# Patient Record
Sex: FEMALE | Race: BLACK OR AFRICAN AMERICAN | NOT HISPANIC OR LATINO | URBAN - METROPOLITAN AREA
[De-identification: names, ages, dates, MRNs, and addresses within clinical notes are randomized per-mention and may not be internally consistent; named-entity substitution may affect disease eponyms.]

---

## 2017-05-01 ENCOUNTER — EMERGENCY (EMERGENCY)
Facility: HOSPITAL | Age: 37
LOS: 0 days | Discharge: HOME | End: 2017-05-01
Attending: EMERGENCY MEDICINE
Payer: MEDICAID

## 2017-05-01 VITALS
DIASTOLIC BLOOD PRESSURE: 87 MMHG | RESPIRATION RATE: 16 BRPM | WEIGHT: 162.92 LBS | OXYGEN SATURATION: 99 % | HEIGHT: 65 IN | SYSTOLIC BLOOD PRESSURE: 128 MMHG | TEMPERATURE: 98 F | HEART RATE: 87 BPM

## 2017-05-01 VITALS
HEART RATE: 72 BPM | DIASTOLIC BLOOD PRESSURE: 67 MMHG | SYSTOLIC BLOOD PRESSURE: 131 MMHG | OXYGEN SATURATION: 100 % | RESPIRATION RATE: 17 BRPM | TEMPERATURE: 98 F

## 2017-05-01 DIAGNOSIS — M25.551 PAIN IN RIGHT HIP: ICD-10-CM

## 2017-05-01 DIAGNOSIS — M54.31 SCIATICA, RIGHT SIDE: ICD-10-CM

## 2017-05-01 PROCEDURE — 99283 EMERGENCY DEPT VISIT LOW MDM: CPT

## 2017-05-01 RX ORDER — CYCLOBENZAPRINE HYDROCHLORIDE 10 MG/1
10 TABLET, FILM COATED ORAL ONCE
Qty: 0 | Refills: 0 | Status: COMPLETED | OUTPATIENT
Start: 2017-05-01 | End: 2017-05-01

## 2017-05-01 RX ORDER — CYCLOBENZAPRINE HYDROCHLORIDE 10 MG/1
1 TABLET, FILM COATED ORAL
Qty: 15 | Refills: 0
Start: 2017-05-01 | End: 2017-05-06

## 2017-05-01 RX ADMIN — CYCLOBENZAPRINE HYDROCHLORIDE 10 MILLIGRAM(S): 10 TABLET, FILM COATED ORAL at 10:48

## 2017-05-01 NOTE — ED PROVIDER NOTE - OBJECTIVE STATEMENT
Pt is a 38 yo lady with no significant past medical history who presents to the ED with R. back pain shooting down leg. No trauma, no fevers, no n/v/d, no chest pain, no sob. Has had back pain before that resolves. This pain started 2 days ago, constant, no bowel or bladder incontinence, no saddle anesthesia. No numbness or tingling.

## 2017-05-01 NOTE — ED ADULT NURSE NOTE - OBJECTIVE STATEMENT
patient received, alert and oriented x3, complaining of right hip pain on and off for 1 week, worsening today, denies falling/trauma. no distress noted.

## 2017-05-09 ENCOUNTER — TRANSCRIPTION ENCOUNTER (OUTPATIENT)
Age: 37
End: 2017-05-09

## 2017-05-16 ENCOUNTER — TRANSCRIPTION ENCOUNTER (OUTPATIENT)
Age: 37
End: 2017-05-16

## 2018-09-14 NOTE — ED PROVIDER NOTE - CPE EDP [facility] NORM
Case d/w Emma Johnson NP at [facility] Pt is in [facility] ER for SOB/ progressive metastatic breast cancer Pt is doing wBXRT for brain mets that were significantly worse Pt has progressive body disease also and was most recently on tykerb/ xeloda which was stopped Discussion with Dr Jose Isaacs is regarding possibility of leptomeningeal disease One LP showed atypical cells and pt was supposed to have another LP at [facility] Pt is aware of palliative goal of care Was considering further chemo. Not sure would do IT chemo but have not discussed this. [id number] team to see pt
normal...

## 2019-04-22 ENCOUNTER — TRANSCRIPTION ENCOUNTER (OUTPATIENT)
Age: 39
End: 2019-04-22

## 2020-04-25 ENCOUNTER — MESSAGE (OUTPATIENT)
Age: 40
End: 2020-04-25

## 2022-02-14 NOTE — ED ADULT TRIAGE NOTE - RESPIRATORY RATE (BREATHS/MIN)
You can try Vaseline on his face as needed.   Stop adding alcohol to his bath water.   Try using a different more moisturizing cleanser such as Cetaphil Baby Line or CeraVe baby lines.   You can also use one of their moisturizing creams on his body.   Call or message if his skin is getting worse.   Return for his next well visit or sooner as needed.     16

## 2023-02-02 ENCOUNTER — INPATIENT (INPATIENT)
Facility: HOSPITAL | Age: 43
LOS: 0 days | Discharge: HOME | DRG: 247 | End: 2023-02-03
Attending: INTERNAL MEDICINE | Admitting: INTERNAL MEDICINE
Payer: COMMERCIAL

## 2023-02-02 ENCOUNTER — EMERGENCY (EMERGENCY)
Facility: HOSPITAL | Age: 43
LOS: 0 days | Discharge: ANOTHER HOSPITAL | End: 2023-02-02
Attending: STUDENT IN AN ORGANIZED HEALTH CARE EDUCATION/TRAINING PROGRAM
Payer: COMMERCIAL

## 2023-02-02 VITALS
RESPIRATION RATE: 20 BRPM | DIASTOLIC BLOOD PRESSURE: 95 MMHG | OXYGEN SATURATION: 100 % | WEIGHT: 134.92 LBS | SYSTOLIC BLOOD PRESSURE: 126 MMHG | HEIGHT: 65 IN | HEART RATE: 54 BPM | TEMPERATURE: 97 F

## 2023-02-02 VITALS
TEMPERATURE: 98 F | HEART RATE: 70 BPM | OXYGEN SATURATION: 99 % | RESPIRATION RATE: 16 BRPM | DIASTOLIC BLOOD PRESSURE: 100 MMHG | SYSTOLIC BLOOD PRESSURE: 125 MMHG

## 2023-02-02 DIAGNOSIS — I21.3 ST ELEVATION (STEMI) MYOCARDIAL INFARCTION OF UNSPECIFIED SITE: ICD-10-CM

## 2023-02-02 DIAGNOSIS — Z20.822 CONTACT WITH AND (SUSPECTED) EXPOSURE TO COVID-19: ICD-10-CM

## 2023-02-02 DIAGNOSIS — F17.200 NICOTINE DEPENDENCE, UNSPECIFIED, UNCOMPLICATED: ICD-10-CM

## 2023-02-02 DIAGNOSIS — Z91.012 ALLERGY TO EGGS: ICD-10-CM

## 2023-02-02 DIAGNOSIS — Z98.891 HISTORY OF UTERINE SCAR FROM PREVIOUS SURGERY: Chronic | ICD-10-CM

## 2023-02-02 DIAGNOSIS — R00.1 BRADYCARDIA, UNSPECIFIED: ICD-10-CM

## 2023-02-02 DIAGNOSIS — R07.89 OTHER CHEST PAIN: ICD-10-CM

## 2023-02-02 DIAGNOSIS — I10 ESSENTIAL (PRIMARY) HYPERTENSION: ICD-10-CM

## 2023-02-02 DIAGNOSIS — E78.5 HYPERLIPIDEMIA, UNSPECIFIED: ICD-10-CM

## 2023-02-02 LAB
ALBUMIN SERPL ELPH-MCNC: 4 G/DL — SIGNIFICANT CHANGE UP (ref 3.3–5)
ALBUMIN SERPL ELPH-MCNC: 4.3 G/DL — SIGNIFICANT CHANGE UP (ref 3.3–5)
ALP SERPL-CCNC: 50 U/L — SIGNIFICANT CHANGE UP (ref 40–120)
ALP SERPL-CCNC: 54 U/L — SIGNIFICANT CHANGE UP (ref 40–120)
ALT FLD-CCNC: 11 U/L — SIGNIFICANT CHANGE UP (ref 10–45)
ALT FLD-CCNC: 21 U/L — SIGNIFICANT CHANGE UP (ref 12–78)
ANION GAP SERPL CALC-SCNC: 10 MMOL/L — SIGNIFICANT CHANGE UP (ref 5–17)
ANION GAP SERPL CALC-SCNC: 11 MMOL/L — SIGNIFICANT CHANGE UP (ref 5–17)
APTT BLD: 27.2 SEC — LOW (ref 27.5–35.5)
AST SERPL-CCNC: 21 U/L — SIGNIFICANT CHANGE UP (ref 15–37)
AST SERPL-CCNC: 22 U/L — SIGNIFICANT CHANGE UP (ref 10–40)
BASOPHILS # BLD AUTO: 0.05 K/UL — SIGNIFICANT CHANGE UP (ref 0–0.2)
BASOPHILS NFR BLD AUTO: 0.7 % — SIGNIFICANT CHANGE UP (ref 0–2)
BILIRUB SERPL-MCNC: 0.3 MG/DL — SIGNIFICANT CHANGE UP (ref 0.2–1.2)
BILIRUB SERPL-MCNC: 0.4 MG/DL — SIGNIFICANT CHANGE UP (ref 0.2–1.2)
BLD GP AB SCN SERPL QL: NEGATIVE — SIGNIFICANT CHANGE UP
BUN SERPL-MCNC: 11 MG/DL — SIGNIFICANT CHANGE UP (ref 7–23)
BUN SERPL-MCNC: 8 MG/DL — SIGNIFICANT CHANGE UP (ref 7–23)
CALCIUM SERPL-MCNC: 9.5 MG/DL — SIGNIFICANT CHANGE UP (ref 8.4–10.5)
CALCIUM SERPL-MCNC: 9.5 MG/DL — SIGNIFICANT CHANGE UP (ref 8.5–10.1)
CHLORIDE SERPL-SCNC: 106 MMOL/L — SIGNIFICANT CHANGE UP (ref 96–108)
CHLORIDE SERPL-SCNC: 110 MMOL/L — HIGH (ref 96–108)
CK MB BLD-MCNC: <1.4 % — SIGNIFICANT CHANGE UP (ref 0–3.5)
CK MB CFR SERPL CALC: 5.4 NG/ML — HIGH (ref 0–3.8)
CK MB CFR SERPL CALC: <1 NG/ML — SIGNIFICANT CHANGE UP (ref 0.5–3.6)
CK SERPL-CCNC: 69 U/L — SIGNIFICANT CHANGE UP (ref 26–192)
CK SERPL-CCNC: 88 U/L — SIGNIFICANT CHANGE UP (ref 25–170)
CO2 SERPL-SCNC: 22 MMOL/L — SIGNIFICANT CHANGE UP (ref 22–31)
CO2 SERPL-SCNC: 22 MMOL/L — SIGNIFICANT CHANGE UP (ref 22–31)
CREAT SERPL-MCNC: 0.6 MG/DL — SIGNIFICANT CHANGE UP (ref 0.5–1.3)
CREAT SERPL-MCNC: 0.88 MG/DL — SIGNIFICANT CHANGE UP (ref 0.5–1.3)
EGFR: 114 ML/MIN/1.73M2 — SIGNIFICANT CHANGE UP
EGFR: 84 ML/MIN/1.73M2 — SIGNIFICANT CHANGE UP
EOSINOPHIL # BLD AUTO: 0.09 K/UL — SIGNIFICANT CHANGE UP (ref 0–0.5)
EOSINOPHIL NFR BLD AUTO: 1.3 % — SIGNIFICANT CHANGE UP (ref 0–6)
FLUAV AG NPH QL: SIGNIFICANT CHANGE UP
FLUBV AG NPH QL: SIGNIFICANT CHANGE UP
GLUCOSE SERPL-MCNC: 116 MG/DL — HIGH (ref 70–99)
GLUCOSE SERPL-MCNC: 145 MG/DL — HIGH (ref 70–99)
HCT VFR BLD CALC: 45.8 % — HIGH (ref 34.5–45)
HGB BLD-MCNC: 15.1 G/DL — SIGNIFICANT CHANGE UP (ref 11.5–15.5)
IMM GRANULOCYTES NFR BLD AUTO: 0.1 % — SIGNIFICANT CHANGE UP (ref 0–0.9)
INR BLD: 0.96 RATIO — SIGNIFICANT CHANGE UP (ref 0.88–1.16)
LYMPHOCYTES # BLD AUTO: 2.66 K/UL — SIGNIFICANT CHANGE UP (ref 1–3.3)
LYMPHOCYTES # BLD AUTO: 39.1 % — SIGNIFICANT CHANGE UP (ref 13–44)
MAGNESIUM SERPL-MCNC: 1.8 MG/DL — SIGNIFICANT CHANGE UP (ref 1.6–2.6)
MAGNESIUM SERPL-MCNC: 1.9 MG/DL — SIGNIFICANT CHANGE UP (ref 1.6–2.6)
MCHC RBC-ENTMCNC: 27.4 PG — SIGNIFICANT CHANGE UP (ref 27–34)
MCHC RBC-ENTMCNC: 33 G/DL — SIGNIFICANT CHANGE UP (ref 32–36)
MCV RBC AUTO: 83.1 FL — SIGNIFICANT CHANGE UP (ref 80–100)
MONOCYTES # BLD AUTO: 0.9 K/UL — SIGNIFICANT CHANGE UP (ref 0–0.9)
MONOCYTES NFR BLD AUTO: 13.2 % — SIGNIFICANT CHANGE UP (ref 2–14)
NEUTROPHILS # BLD AUTO: 3.09 K/UL — SIGNIFICANT CHANGE UP (ref 1.8–7.4)
NEUTROPHILS NFR BLD AUTO: 45.6 % — SIGNIFICANT CHANGE UP (ref 43–77)
NRBC # BLD: 0 /100 WBCS — SIGNIFICANT CHANGE UP (ref 0–0)
PHOSPHATE SERPL-MCNC: 3.1 MG/DL — SIGNIFICANT CHANGE UP (ref 2.5–4.5)
PLATELET # BLD AUTO: 238 K/UL — SIGNIFICANT CHANGE UP (ref 150–400)
POTASSIUM SERPL-MCNC: 3.6 MMOL/L — SIGNIFICANT CHANGE UP (ref 3.5–5.3)
POTASSIUM SERPL-MCNC: 4 MMOL/L — SIGNIFICANT CHANGE UP (ref 3.5–5.3)
POTASSIUM SERPL-SCNC: 3.6 MMOL/L — SIGNIFICANT CHANGE UP (ref 3.5–5.3)
POTASSIUM SERPL-SCNC: 4 MMOL/L — SIGNIFICANT CHANGE UP (ref 3.5–5.3)
PROT SERPL-MCNC: 7.1 G/DL — SIGNIFICANT CHANGE UP (ref 6–8.3)
PROT SERPL-MCNC: 8 GM/DL — SIGNIFICANT CHANGE UP (ref 6–8.3)
PROTHROM AB SERPL-ACNC: 11.4 SEC — SIGNIFICANT CHANGE UP (ref 10.5–13.4)
RBC # BLD: 5.51 M/UL — HIGH (ref 3.8–5.2)
RBC # FLD: 13.7 % — SIGNIFICANT CHANGE UP (ref 10.3–14.5)
RH IG SCN BLD-IMP: POSITIVE — SIGNIFICANT CHANGE UP
SARS-COV-2 RNA SPEC QL NAA+PROBE: SIGNIFICANT CHANGE UP
SODIUM SERPL-SCNC: 138 MMOL/L — SIGNIFICANT CHANGE UP (ref 135–145)
SODIUM SERPL-SCNC: 143 MMOL/L — SIGNIFICANT CHANGE UP (ref 135–145)
TROPONIN I, HIGH SENSITIVITY RESULT: 46.8 NG/L — SIGNIFICANT CHANGE UP
TROPONIN T, HIGH SENSITIVITY RESULT: 95 NG/L — HIGH (ref 0–51)
WBC # BLD: 6.8 K/UL — SIGNIFICANT CHANGE UP (ref 3.8–10.5)
WBC # FLD AUTO: 6.8 K/UL — SIGNIFICANT CHANGE UP (ref 3.8–10.5)

## 2023-02-02 PROCEDURE — 99232 SBSQ HOSP IP/OBS MODERATE 35: CPT

## 2023-02-02 PROCEDURE — 99152 MOD SED SAME PHYS/QHP 5/>YRS: CPT

## 2023-02-02 PROCEDURE — 92941 PRQ TRLML REVSC TOT OCCL AMI: CPT | Mod: RC

## 2023-02-02 PROCEDURE — 99285 EMERGENCY DEPT VISIT HI MDM: CPT

## 2023-02-02 PROCEDURE — 93454 CORONARY ARTERY ANGIO S&I: CPT | Mod: 26,59

## 2023-02-02 PROCEDURE — 93010 ELECTROCARDIOGRAM REPORT: CPT

## 2023-02-02 PROCEDURE — 99291 CRITICAL CARE FIRST HOUR: CPT | Mod: 25

## 2023-02-02 RX ORDER — TICAGRELOR 90 MG/1
90 TABLET ORAL EVERY 12 HOURS
Refills: 0 | Status: DISCONTINUED | OUTPATIENT
Start: 2023-02-03 | End: 2023-02-03

## 2023-02-02 RX ORDER — ASPIRIN/CALCIUM CARB/MAGNESIUM 324 MG
81 TABLET ORAL DAILY
Refills: 0 | Status: DISCONTINUED | OUTPATIENT
Start: 2023-02-02 | End: 2023-02-03

## 2023-02-02 RX ORDER — OMEPRAZOLE 10 MG/1
0 CAPSULE, DELAYED RELEASE ORAL
Qty: 0 | Refills: 0 | DISCHARGE

## 2023-02-02 RX ORDER — MAGNESIUM SULFATE 500 MG/ML
2 VIAL (ML) INJECTION ONCE
Refills: 0 | Status: COMPLETED | OUTPATIENT
Start: 2023-02-02 | End: 2023-02-02

## 2023-02-02 RX ORDER — TICAGRELOR 90 MG/1
180 TABLET ORAL ONCE
Refills: 0 | Status: COMPLETED | OUTPATIENT
Start: 2023-02-02 | End: 2023-02-02

## 2023-02-02 RX ORDER — HEPARIN SODIUM 5000 [USP'U]/ML
5000 INJECTION INTRAVENOUS; SUBCUTANEOUS EVERY 8 HOURS
Refills: 0 | Status: DISCONTINUED | OUTPATIENT
Start: 2023-02-03 | End: 2023-02-03

## 2023-02-02 RX ORDER — ATORVASTATIN CALCIUM 80 MG/1
40 TABLET, FILM COATED ORAL AT BEDTIME
Refills: 0 | Status: DISCONTINUED | OUTPATIENT
Start: 2023-02-02 | End: 2023-02-03

## 2023-02-02 RX ORDER — NICOTINE POLACRILEX 2 MG
1 GUM BUCCAL DAILY
Refills: 0 | Status: DISCONTINUED | OUTPATIENT
Start: 2023-02-02 | End: 2023-02-03

## 2023-02-02 RX ORDER — TERBINAFINE HCL 250 MG
0 TABLET ORAL
Qty: 0 | Refills: 0 | DISCHARGE

## 2023-02-02 RX ORDER — PANTOPRAZOLE SODIUM 20 MG/1
40 TABLET, DELAYED RELEASE ORAL
Refills: 0 | Status: DISCONTINUED | OUTPATIENT
Start: 2023-02-02 | End: 2023-02-03

## 2023-02-02 RX ORDER — HEPARIN SODIUM 5000 [USP'U]/ML
3800 INJECTION INTRAVENOUS; SUBCUTANEOUS EVERY 6 HOURS
Refills: 0 | Status: DISCONTINUED | OUTPATIENT
Start: 2023-02-02 | End: 2023-02-02

## 2023-02-02 RX ORDER — SODIUM CHLORIDE 9 MG/ML
1000 INJECTION INTRAMUSCULAR; INTRAVENOUS; SUBCUTANEOUS ONCE
Refills: 0 | Status: DISCONTINUED | OUTPATIENT
Start: 2023-02-02 | End: 2023-02-02

## 2023-02-02 RX ORDER — OXYCODONE HYDROCHLORIDE 5 MG/1
1 TABLET ORAL
Qty: 0 | Refills: 0 | DISCHARGE

## 2023-02-02 RX ORDER — HEPARIN SODIUM 5000 [USP'U]/ML
3800 INJECTION INTRAVENOUS; SUBCUTANEOUS ONCE
Refills: 0 | Status: COMPLETED | OUTPATIENT
Start: 2023-02-02 | End: 2023-02-02

## 2023-02-02 RX ORDER — VALACYCLOVIR 500 MG/1
0 TABLET, FILM COATED ORAL
Qty: 0 | Refills: 0 | DISCHARGE

## 2023-02-02 RX ORDER — METOPROLOL TARTRATE 50 MG
12.5 TABLET ORAL EVERY 12 HOURS
Refills: 0 | Status: DISCONTINUED | OUTPATIENT
Start: 2023-02-02 | End: 2023-02-03

## 2023-02-02 RX ORDER — PANTOPRAZOLE SODIUM 20 MG/1
40 TABLET, DELAYED RELEASE ORAL ONCE
Refills: 0 | Status: COMPLETED | OUTPATIENT
Start: 2023-02-02 | End: 2023-02-02

## 2023-02-02 RX ORDER — HEPARIN SODIUM 5000 [USP'U]/ML
INJECTION INTRAVENOUS; SUBCUTANEOUS
Qty: 25000 | Refills: 0 | Status: DISCONTINUED | OUTPATIENT
Start: 2023-02-02 | End: 2023-02-02

## 2023-02-02 RX ORDER — SODIUM CHLORIDE 9 MG/ML
1000 INJECTION INTRAMUSCULAR; INTRAVENOUS; SUBCUTANEOUS
Refills: 0 | Status: DISCONTINUED | OUTPATIENT
Start: 2023-02-02 | End: 2023-02-02

## 2023-02-02 RX ADMIN — HEPARIN SODIUM 3800 UNIT(S): 5000 INJECTION INTRAVENOUS; SUBCUTANEOUS at 11:21

## 2023-02-02 RX ADMIN — SODIUM CHLORIDE 150 MILLILITER(S): 9 INJECTION INTRAMUSCULAR; INTRAVENOUS; SUBCUTANEOUS at 13:14

## 2023-02-02 RX ADMIN — Medication 12.5 MILLIGRAM(S): at 21:16

## 2023-02-02 RX ADMIN — TICAGRELOR 180 MILLIGRAM(S): 90 TABLET ORAL at 11:18

## 2023-02-02 RX ADMIN — HEPARIN SODIUM 750 UNIT(S)/HR: 5000 INJECTION INTRAVENOUS; SUBCUTANEOUS at 11:22

## 2023-02-02 RX ADMIN — ATORVASTATIN CALCIUM 40 MILLIGRAM(S): 80 TABLET, FILM COATED ORAL at 21:15

## 2023-02-02 RX ADMIN — Medication 25 GRAM(S): at 21:15

## 2023-02-02 RX ADMIN — PANTOPRAZOLE SODIUM 40 MILLIGRAM(S): 20 TABLET, DELAYED RELEASE ORAL at 21:16

## 2023-02-02 NOTE — ED PROVIDER NOTE - PHYSICAL EXAMINATION
Gen: Uncomfortable appearing, AOx3, able to make needs known, non-toxic  Head: NCAT  HEENT: EOMI, oral mucosa moist, normal conjunctiva  Lung: CTAB, no respiratory distress, no wheezes/rhonchi/rales B/L, speaking in full sentences  CV: Bradycardic, no murmurs  Abd: non distended, soft, nontender, no guarding  MSK: no visible deformities  Neuro: Appears non focal  Skin: Warm, well perfused  Psych: normal affect

## 2023-02-02 NOTE — H&P ADULT - NSHPPHYSICALEXAM_GEN_ALL_CORE
Constitutional: NAD, well-groomed, well-developed  HEENT: PERRLA, EOMI, no drainage or redness  Neck: supple,  No JVD  Respiratory: Breath Sounds equal & clear bilaterally to auscultation, no rales/rhonchi/wheezing, no accessory muscle use noted  Cardiovascular: Regular rate, regular rhythm, normal S1, S2; no murmurs or rub  Gastrointestinal: Soft, non-tender, non distended, + bowel sounds  Extremities: R wrist access site currently with minimal oozing. Pressure dressing applied, will apply pressure if needed however no signs of hematoma or excess bleeding. MILLIGAN x 4, no peripheral edema, no cyanosis, no clubbing. +periperal pulses.   Neurological: A+O x 3; speech clear and intact; no sensory, motor  deficits, normal reflexes. Nonfocal.   Skin: warm, dry, well perfused Constitutional: NAD, well-groomed, well-developed  HEENT: PERRLA, EOMI, no drainage or redness  Neck: supple,  No JVD  Respiratory: Breath Sounds equal & clear bilaterally to auscultation, no rales/rhonchi/wheezing, no accessory muscle use noted  Cardiovascular: Regular rate, regular rhythm, normal S1, S2; no murmurs or rub  Gastrointestinal: Soft, non-tender, non distended, + bowel sounds  Extremities: R wrist access site currently with minimal oozing. Pressure dressing applied,  MILLIGAN x 4, no peripheral edema, no cyanosis, no clubbing. +periperal pulses.   Neurological: A+O x 3; speech clear and intact; no sensory, motor  deficits, normal reflexes. Nonfocal.   Skin: warm, dry, well perfused

## 2023-02-02 NOTE — PATIENT PROFILE ADULT - FALL HARM RISK - UNIVERSAL INTERVENTIONS
Bed in lowest position, wheels locked, appropriate side rails in place/Call bell, personal items and telephone in reach/Instruct patient to call for assistance before getting out of bed or chair/Non-slip footwear when patient is out of bed/[place] to call system/Physically safe environment - no spills, clutter or unnecessary equipment/Purposeful Proactive Rounding/Room/bathroom lighting operational, light cord in reach

## 2023-02-02 NOTE — ED PROVIDER NOTE - CARE PLAN
1 Principal Discharge DX:	NSTEMI (non-ST elevation myocardial infarction)   Principal Discharge DX:	STEMI (ST elevation myocardial infarction)

## 2023-02-02 NOTE — ED PROVIDER NOTE - CLINICAL SUMMARY MEDICAL DECISION MAKING FREE TEXT BOX
42 y/o F w/ PMH as above presenting w/ 10 days of chest pain, worsening today. Pt uncomfortable appearing on exam. EKG performed shortly after arrival w/ findings concerning for inferior wall STEMI. Pt brought directly to resus room by triage RN after that. Repeat EKG w/ similar morphology, PARISA in III and aVF, reciprocal depressions in I and aVL. Right sided EKG w/ subtle changes to v4-v6, nitro held due to concern for possible right ventricular involvement. Spoke w/ cath attending via transfer center, Dr. Salazar, who accepted pt directly to cath lab for PCI. Brilinta given and hep bolus/drip given as well. Pt updated on plan and agreeable w/ transfer, paperwork signed w/ daughter at bedside. EMS arrived within minutes of transfer conversation. Pt HD stable at time of transfer to [facility].

## 2023-02-02 NOTE — PROGRESS NOTE ADULT - SUBJECTIVE AND OBJECTIVE BOX
FLO SOLORIO  MRN-[id number]  Patient is a 43y old  Female who presents with a chief complaint of STEMI (2023 16:35)    HPI:  42yo F with PMHx of HTN, GERD with belcher's esophagus BIBEMS to [facility] presenting with chest pain x10 days. Pt states she initially thought pain was 2/2 reflux/GERD and tried taking PPIs, pepcid and Mylanta without relief of sx's, Pain was worse with increased activity. Pt states pain was more severe today described as constant, moderate, burning left-sided chest pain radiating to the jaw and down the left hand. Admits to some associated diaphoresis today and shortness of breath. Denies prior cardiac work-up. Denies any fevers, chills, nausea/vomiting/diarrhea, lightheadedness, dizziness or changes in vision.    Pt was found to have NSTEMI in [facility] ER with normal cardiac markers and was sent to [facility] for cardiac cathter intervention.    (2023 16:35)      Hospital Course:    24 HOUR EVENTS:    REVIEW OF SYSTEMS:    CONSTITUTIONAL: No weakness, fevers or chills  EYES/ENT: No visual changes;  No vertigo or throat pain   NECK: No pain or stiffness  RESPIRATORY: No cough, wheezing, hemoptysis; No shortness of breath  CARDIOVASCULAR: No chest pain or palpitations  GASTROINTESTINAL: No abdominal or epigastric pain. No nausea, vomiting, or hematemesis; No diarrhea or constipation. No melena or hematochezia.  GENITOURINARY: No dysuria, frequency or hematuria  NEUROLOGICAL: No numbness or weakness  SKIN: No itching, rashes      ICU Vital Signs Last 24 Hrs  T(C): 37.1 (2023 19:30), Max: 37.1 (2023 19:30)  T(F): 98.8 (2023 19:30), Max: 98.8 (2023 19:30)  HR: 66 (2023 20:00) (54 - 96)  BP: 142/95 (2023 20:00) (125/100 - 185/95)  BP(mean): 114 (2023 20:00) (106 - 122)  ABP: --  ABP(mean): --  RR: 28 (2023 20:00) (14 - 30)  SpO2: 100% (2023 20:00) (94% - 100%)    O2 Parameters below as of 2023 20:00  Patient On (Oxygen Delivery Method): room air            CVP(mm Hg): --  CO: --  CI: --  PA: --  PA(mean): --  PA(direct): --  PCWP: --  LA: --  RA: --  SVR: --  SVRI: --  PVR: --  PVRI: --  I&O's Summary    2023 07:01  -  2023 20:50  --------------------------------------------------------  IN: 150 mL / OUT: 200 mL / NET: -50 mL        CAPILLARY BLOOD GLUCOSE    CAPILLARY BLOOD GLUCOSE          PHYSICAL EXAM:  GENERAL: No acute distress, well-developed  HEAD:  Atraumatic, Normocephalic  EYES: EOMI, PERRLA, conjunctiva and sclera clear  NECK: Supple, no lymphadenopathy, no JVD  CHEST/LUNG: CTAB; No wheezes, rales, or rhonchi  HEART: Regular rate and rhythm. Normal S1/S2. No murmurs, rubs, or gallops  ABDOMEN: Soft, non-tender, non-distended; normal bowel sounds, no organomegaly  EXTREMITIES:  2+ peripheral pulses b/l, No clubbing, cyanosis, or edema  NEUROLOGY: A&O x 3, no focal deficits  SKIN: No rashes or lesions    ============================I/O===========================   I&O's Detail    2023 07:01  -  2023 20:50  --------------------------------------------------------  IN:    sodium chloride 0.9%: 150 mL  Total IN: 150 mL    OUT:    Voided (mL): 200 mL  Total OUT: 200 mL    Total NET: -50 mL        ============================ LABS =========================                        15.1   6.80  )-----------( 238      ( 2023 11:20 )             45.8         138  |  106  |  8   ----------------------------<  116<H>  4.0   |  22  |  0.60    Ca    9.5      2023 20:10  Phos  3.1       Mg     1.8         TPro  7.1  /  Alb  4.3  /  TBili  0.4  /  DBili  x   /  AST  22  /  ALT  11  /  AlkPhos  50      Troponin T, High Sensitivity Result: 95 ng/L (23 @ 20:10)    CKMB Units: 5.4 ng/mL (23 @ 20:10)  CKMB Units: <1.0 ng/mL (23 @ 11:20)    Creatine Kinase, Serum: 88 U/L (23 @ 20:10)  Creatine Kinase, Serum: 69 U/L (23 @ 11:20)    CPK Mass Assay %: <1.4 % (23 @ 11:20)        LIVER FUNCTIONS - ( 2023 20:10 )  Alb: 4.3 g/dL / Pro: 7.1 g/dL / ALK PHOS: 50 U/L / ALT: 11 U/L / AST: 22 U/L / GGT: x           PT/INR - ( 2023 11:28 )   PT: 11.4 sec;   INR: 0.96 ratio         PTT - ( 2023 11:28 )  PTT:27.2 sec        ======================Micro/Rad/Cardio=================  Telemtry: Reviewed   EKG: Reviewed  CXR: Reviewed  Culture: Reviewed   Echo:   Cath:   ======================================================  PAST MEDICAL & SURGICAL HISTORY:  No pertinent past medical history      HTN (hypertension)      GERD (gastroesophageal reflux disease)      History of Belcher&#x27;s esophagus      H/O:   ====================ASSESSMENT ==============  42yo F with PMHx of HTN, GERD with belcher's esophagus BIBEMS to [facility] presenting with chest pain x10 days presenting with NSTEMI s/p cardiac cath with Dr. Joya. mRCA found to be 99% occluded s/p MARIE.    ====================== NEUROLOGY=====================  A&Ox4  Recovering from post procedure sedation  Nicotine patch for smoking withdrawal  - Bedrest until CE peak  ====================CARDIOVASCULAR==================  NSTEMI s/p RCA MARIE  - Continue ASA, Brilinta, Lipitor   BP ,  (125/100 - 160/102) will start Metoprolol 12.5 BID   HR low 60s, continue to monitor  - Trend CE  - Check TTE  ==================== RESPIRATORY======================  Currently saturating 100% on RA, keep >90      ===================== RENAL =========================    23 @ 07:01  -  23 @ 17:07  --------------------------------------------------------  IN: 150 mL / OUT: 0 mL / NET: 150 mL      BUN/Cr: 11/0.88, normal, will monitor with routine labs      ==================== GASTROINTESTINAL===================    Diet: REG, DASH/TLC    Initiate home PPI 40mg Protonix qd    ========================INFECTIOUS DISEASE================  T(C): 36.9 (23 @ 16:02), Max: 36.9 (23 @ 16:02)  WBC Count: 6.80 K/uL (23 @ 11:20)    No current indication for abx. Will monitor leukocytosis with routine CBCs      ===================HEMATOLOGIC/ONC ===================  Hemoglobin: 15.1 g/dL (23 @ 11:20)    Platelet Count - Automated: 238 K/uL (23 @ 11:20)    Will initiate Heparin subQ DVT prophylaxis at midnight    aspirin enteric coated 81 milliGRAM(s) Oral daily for maintenance    =======================    ENDOCRINE  =====================  No indication for insulin at this time however glucose was 145 in ER will continue to monitor with routine labs. Check HA1C        atorvastatin 40 milliGRAM(s) Oral at bedtime    ======================= LINES/TUBES  =====================    Pt currently without lines. Access for cath obtained through R radial artery    ======================= DISPOSITION  =====================  Pt is stable at this time with anticipated d/c to floor tomorrow after overnight monitoring      Patient requires continuous monitoring with bedside rhythm monitoring, pulse ox monitoring, and intermittent blood gas analysis. Care plan discussed with ICU care team. Patient remained critical and at risk for life threatening decompensation.  Patient seen, examined and plan discussed with CCU team during rounds.      Patient requires continuous monitoring with bedside rhythm monitoring, pulse ox monitoring, and intermittent blood gas analysis. Care plan discussed with ICU care team. Patient remained critical and at risk for life threatening decompensation.  Patient seen, examined and plan discussed with CCU team during rounds.     I have personally provided ____ minutes of critical care time excluding time spent on separate procedures, in addition to initial critical care time provided by the CICU Attending, Dr. Nagy.    By signing my name below, I, Aileen Jacques, attest that this documentation has been prepared under the direction and in the presence of RANJAN Christianson.  Electronically signed: Sharon Lin, 23 @ 20:50    I, Cheri Brown, personally performed the services described in this documentation. all medical record entries made by the galaibfadumo were at my direction and in my presence. I have reviewed the chart and agree that the record reflects my personal performance and is accurate and complete  Electronically signed: RANJAN Christianson.        FLO SOLORIO  MRN-[id number]  Patient is a 43y old  Female who presents with a chief complaint of STEMI (2023 16:35)    HPI:  42yo F with PMHx of HTN, GERD with belcher's esophagus BIBEMS to [facility] presenting with chest pain x10 days. Pt states she initially thought pain was 2/2 reflux/GERD and tried taking PPIs, pepcid and Mylanta without relief of sx's, Pain was worse with increased activity. Pt states pain was more severe today described as constant, moderate, burning left-sided chest pain radiating to the jaw and down the left hand. Admits to some associated diaphoresis today and shortness of breath. Denies prior cardiac work-up. Denies any fevers, chills, nausea/vomiting/diarrhea, lightheadedness, dizziness or changes in vision.    Pt was found to have NSTEMI in [facility] ER with normal cardiac markers and was sent to [facility] for cardiac cathter intervention.    (2023 16:35)      24 HOUR EVENTS:  s/p LHC with MARIE to mRCA  radial band removed without issue    REVIEW OF SYSTEMS:    CONSTITUTIONAL: No weakness, fevers or chills  EYES/ENT: No visual changes;  No vertigo or throat pain   NECK: No pain or stiffness  RESPIRATORY: No cough, wheezing, hemoptysis; No shortness of breath  CARDIOVASCULAR: No chest pain or palpitations  GASTROINTESTINAL: No abdominal or epigastric pain. No nausea, vomiting, or hematemesis; No diarrhea or constipation. No melena or hematochezia.  GENITOURINARY: No dysuria, frequency or hematuria  NEUROLOGICAL: No numbness or weakness  SKIN: No itching, rashes      ICU Vital Signs Last 24 Hrs  T(C): 37.1 (2023 19:30), Max: 37.1 (2023 19:30)  T(F): 98.8 (2023 19:30), Max: 98.8 (2023 19:30)  HR: 66 (2023 20:00) (54 - 96)  BP: 142/95 (2023 20:00) (125/100 - 185/95)  BP(mean): 114 (2023 20:00) (106 - 122)  RR: 28 (2023 20:00) (14 - 30)  SpO2: 100% (2023 20:00) (94% - 100%)    O2 Parameters below as of 2023 20:00  Patient On (Oxygen Delivery Method): room air        I&O's Summary    2023 07:01  -  2023 20:50  --------------------------------------------------------  IN: 150 mL / OUT: 200 mL / NET: -50 mL        CAPILLARY BLOOD GLUCOSE    CAPILLARY BLOOD GLUCOSE          ============================I/O===========================   I&O's Detail    2023 07:01  -  2023 20:50  --------------------------------------------------------  IN:    sodium chloride 0.9%: 150 mL  Total IN: 150 mL    OUT:    Voided (mL): 200 mL  Total OUT: 200 mL    Total NET: -50 mL        ============================ LABS =========================                        15.1   6.80  )-----------( 238      ( 2023 11:20 )             45.8     02-02    138  |  106  |  8   ----------------------------<  116<H>  4.0   |  22  |  0.60    Ca    9.5      2023 20:10  Phos  3.1     02-02  Mg     1.8     02-02    TPro  7.1  /  Alb  4.3  /  TBili  0.4  /  DBili  x   /  AST  22  /  ALT  11  /  AlkPhos  50      Troponin T, High Sensitivity Result: 95 ng/L (23 @ 20:10)    CKMB Units: 5.4 ng/mL (23 @ 20:10)  CKMB Units: <1.0 ng/mL (23 @ 11:20)    Creatine Kinase, Serum: 88 U/L (23 @ 20:10)  Creatine Kinase, Serum: 69 U/L (23 @ 11:20)    CPK Mass Assay %: <1.4 % (23 @ 11:20)        LIVER FUNCTIONS - ( 2023 20:10 )  Alb: 4.3 g/dL / Pro: 7.1 g/dL / ALK PHOS: 50 U/L / ALT: 11 U/L / AST: 22 U/L / GGT: x           PT/INR - ( 2023 11:28 )   PT: 11.4 sec;   INR: 0.96 ratio         PTT - ( 2023 11:28 )  PTT:27.2 sec        ======================Micro/Rad/Cardio=================  Telemtry: Reviewed   EKG: Reviewed  CXR: Reviewed  Culture: Reviewed   Echo:   Cath:   ======================================================  PAST MEDICAL & SURGICAL HISTORY:  No pertinent past medical history      HTN (hypertension)      GERD (gastroesophageal reflux disease)      History of Belcher&#x27;s esophagus      H/O:   ====================ASSESSMENT ==============  42yo F with PMHx of HTN, GERD with belcher's esophagus BIBEMS to [facility] presenting with chest pain x10 days presenting with NSTEMI s/p cardiac cath with Dr. Joya. mRCA found to be 99% occluded s/p MARIE.    ====================== NEUROLOGY=====================  A&Ox4  - continue to monitor mental status per protocol     ====================CARDIOVASCULAR==================  NSTEMI s/p RCA MARIE  - Continue ASA, Brilinta, Lipitor   - c/w lopressor 25 BID  - Trend CE until peak  - Check TTE  - will start losartan in AM  ==================== RESPIRATORY======================  Comfortable on RA  - Monitor SpO2 with goal >94%     Smoker  - Nicotine patch for smoking withdrawal  - smoking cessation education prior to d/c   ===================== RENAL =========================  SCr wnl  - continue to monitor and trend SCr, BUN, lytes, and I&os  - replete lytes prn with goal K 4-4.5 and Mg >2     ==================== GASTROINTESTINAL===================    - Diet: REG, DASH/TLC as tolerated   - Initiate home PPI 40mg Protonix qd for GERD  - monitor for regular BM while inpatient     ========================INFECTIOUS DISEASE================  Afebrile, no leukocytosis  - continue to monitor WBC and temp curve     ===================HEMATOLOGIC/ONC ===================  Hemoglobin: 15.1 g/dL (23 @ 11:20)    Platelet Count - Automated: 238 K/uL (23 @ 11:20)    Will initiate Heparin subQ DVT prophylaxis at midnight    aspirin enteric coated 81 milliGRAM(s) Oral daily for maintenance    =======================    ENDOCRINE  =====================  No indication for insulin at this time however glucose was 145 in ER will continue to monitor with routine labs. Check HA1C        atorvastatin 40 milliGRAM(s) Oral at bedtime    ======================= LINES/TUBES  =====================    Pt currently without lines. Access for cath obtained through R radial artery    ======================= DISPOSITION  =====================  Pt is stable at this time with anticipated d/c to floor tomorrow after overnight monitoring      Patient requires continuous monitoring with bedside rhythm monitoring, pulse ox monitoring, and intermittent blood gas analysis. Care plan discussed with ICU care team. Patient remained critical and at risk for life threatening decompensation.  Patient seen, examined and plan discussed with CCU team during rounds.      Patient requires continuous monitoring with bedside rhythm monitoring, pulse ox monitoring, and intermittent blood gas analysis. Care plan discussed with ICU care team. Patient remained critical and at risk for life threatening decompensation.  Patient seen, examined and plan discussed with CCU team during rounds.     I have personally provided ____ minutes of critical care time excluding time spent on separate procedures, in addition to initial critical care time provided by the CICU Attending, Dr. Nagy.    By signing my name below, I, Aileen Jacques, attest that this documentation has been prepared under the direction and in the presence of RANJAN Christianson.  Electronically signed: Sharon Lin, 23 @ 20:50    I, Cheri Brown, personally performed the services described in this documentation. all medical record entries made by the scribe were at my direction and in my presence. I have reviewed the chart and agree that the record reflects my personal performance and is accurate and complete  Electronically signed: RANJAN Christianson.        FLO SOLORIO  MRN-[id number]  Patient is a 43y old  Female who presents with a chief complaint of STEMI (2023 16:35)    HPI:  42yo F with PMHx of HTN, GERD with belcher's esophagus BIBEMS to [facility] presenting with chest pain x10 days. Pt states she initially thought pain was 2/2 reflux/GERD and tried taking PPIs, pepcid and Mylanta without relief of sx's, Pain was worse with increased activity. Pt states pain was more severe today described as constant, moderate, burning left-sided chest pain radiating to the jaw and down the left hand. Admits to some associated diaphoresis today and shortness of breath. Denies prior cardiac work-up. Denies any fevers, chills, nausea/vomiting/diarrhea, lightheadedness, dizziness or changes in vision.    Pt was found to have NSTEMI in [facility] ER with normal cardiac markers and was sent to [facility] for cardiac cathter intervention.    (2023 16:35)      24 HOUR EVENTS:  s/p LHC with MARIE to mRCA  radial band removed without issue    REVIEW OF SYSTEMS:    CONSTITUTIONAL: No weakness, fevers or chills  EYES/ENT: No visual changes;  No vertigo or throat pain   NECK: No pain or stiffness  RESPIRATORY: No cough, wheezing, hemoptysis; No shortness of breath  CARDIOVASCULAR: No chest pain or palpitations  GASTROINTESTINAL: No abdominal or epigastric pain. No nausea, vomiting, or hematemesis; No diarrhea or constipation. No melena or hematochezia.  GENITOURINARY: No dysuria, frequency or hematuria  NEUROLOGICAL: No numbness or weakness  SKIN: No itching, rashes      ICU Vital Signs Last 24 Hrs  T(C): 37.1 (2023 19:30), Max: 37.1 (2023 19:30)  T(F): 98.8 (2023 19:30), Max: 98.8 (2023 19:30)  HR: 66 (2023 20:00) (54 - 96)  BP: 142/95 (2023 20:00) (125/100 - 185/95)  BP(mean): 114 (2023 20:00) (106 - 122)  RR: 28 (2023 20:00) (14 - 30)  SpO2: 100% (2023 20:00) (94% - 100%)    O2 Parameters below as of 2023 20:00  Patient On (Oxygen Delivery Method): room air        I&O's Summary    2023 07:01  -  2023 20:50  --------------------------------------------------------  IN: 150 mL / OUT: 200 mL / NET: -50 mL        CAPILLARY BLOOD GLUCOSE    CAPILLARY BLOOD GLUCOSE          ============================I/O===========================   I&O's Detail    2023 07:01  -  2023 20:50  --------------------------------------------------------  IN:    sodium chloride 0.9%: 150 mL  Total IN: 150 mL    OUT:    Voided (mL): 200 mL  Total OUT: 200 mL    Total NET: -50 mL        ============================ LABS =========================                        15.1   6.80  )-----------( 238      ( 2023 11:20 )             45.8     02-02    138  |  106  |  8   ----------------------------<  116<H>  4.0   |  22  |  0.60    Ca    9.5      2023 20:10  Phos  3.1     02-02  Mg     1.8     02-02    TPro  7.1  /  Alb  4.3  /  TBili  0.4  /  DBili  x   /  AST  22  /  ALT  11  /  AlkPhos  50      Troponin T, High Sensitivity Result: 95 ng/L (23 @ 20:10)    CKMB Units: 5.4 ng/mL (23 @ 20:10)  CKMB Units: <1.0 ng/mL (23 @ 11:20)    Creatine Kinase, Serum: 88 U/L (23 @ 20:10)  Creatine Kinase, Serum: 69 U/L (23 @ 11:20)    CPK Mass Assay %: <1.4 % (23 @ 11:20)        LIVER FUNCTIONS - ( 2023 20:10 )  Alb: 4.3 g/dL / Pro: 7.1 g/dL / ALK PHOS: 50 U/L / ALT: 11 U/L / AST: 22 U/L / GGT: x           PT/INR - ( 2023 11:28 )   PT: 11.4 sec;   INR: 0.96 ratio         PTT - ( 2023 11:28 )  PTT:27.2 sec        ======================Micro/Rad/Cardio=================  Telemtry: Reviewed   EKG: Reviewed  CXR: Reviewed  Culture: Reviewed   Echo:   Cath:   ======================================================  PAST MEDICAL & SURGICAL HISTORY:  No pertinent past medical history      HTN (hypertension)      GERD (gastroesophageal reflux disease)      History of Belcher&#x27;s esophagus      H/O:   ====================ASSESSMENT ==============  42yo F with PMHx of HTN, GERD with belcher's esophagus BIBEMS to [facility] presenting with chest pain x10 days presenting with NSTEMI s/p cardiac cath with Dr. Joya. mRCA found to be 99% occluded s/p MARIE.    ====================== NEUROLOGY=====================  A&Ox4  - continue to monitor mental status per protocol     ====================CARDIOVASCULAR==================  NSTEMI s/p RCA MARIE  - Continue ASA, Brilinta, Lipitor   - c/w lopressor 25 BID  - Trend CE until peak  - Check TTE  - will start losartan in AM  ==================== RESPIRATORY======================  Comfortable on RA  - Monitor SpO2 with goal >94%     Smoker  - Nicotine patch for smoking withdrawal  - smoking cessation education prior to d/c   ===================== RENAL =========================  SCr wnl  - continue to monitor and trend SCr, BUN, lytes, and I&os  - replete lytes prn with goal K 4-4.5 and Mg >2     ==================== GASTROINTESTINAL===================  - Diet: REG, DASH/TLC as tolerated   - Initiate home PPI 40mg Protonix qd for GERD  - monitor for regular BM while inpatient     ========================INFECTIOUS DISEASE================  Afebrile, no leukocytosis  - continue to monitor WBC and temp curve     ===================HEMATOLOGIC/ONC ===================  h/h and plts wnl   Will initiate Heparin subQ DVT prophylaxis at midnight    =======================    ENDOCRINE  =====================  No indication for insulin at this time however glucose was 145 in ER will continue to monitor with routine labs. Check HA1C      ======================= DISPOSITION  =====================  Pt is stable at this time with anticipated d/c to floor tomorrow after overnight monitoring      Patient requires continuous monitoring with bedside rhythm monitoring, pulse ox monitoring, and intermittent blood gas analysis. Care plan discussed with ICU care team. Patient remained critical and at risk for life threatening decompensation.  Patient seen, examined and plan discussed with CCU team during rounds.      Patient requires continuous monitoring with bedside rhythm monitoring, pulse ox monitoring, and intermittent blood gas analysis. Care plan discussed with ICU care team. Patient remained critical and at risk for life threatening decompensation.  Patient seen, examined and plan discussed with CCU team during rounds.     I have personally provided _40__ minutes of critical care time excluding time spent on separate procedures, in addition to initial critical care time provided by the CICU Attending, Dr. Nagy.    By signing my name below, I, Aileen Jacques, attest that this documentation has been prepared under the direction and in the presence of RANJAN Christianson.  Electronically signed: Sharon Lin, 23 @ 20:50    I, Cheri Brown, personally performed the services described in this documentation. all medical record entries made by the scribe were at my direction and in my presence. I have reviewed the chart and agree that the record reflects my personal performance and is accurate and complete  Electronically signed: RANJAN Crhistianson.        FLO SOLORIO  MRN-[id number]  Patient is a 43y old  Female who presents with a chief complaint of STEMI (2023 16:35)    HPI:  44yo F with PMHx of HTN, GERD with belcher's esophagus BIBEMS to [facility] presenting with chest pain x10 days. Pt states she initially thought pain was 2/2 reflux/GERD and tried taking PPIs, pepcid and Mylanta without relief of sx's, Pain was worse with increased activity. Pt states pain was more severe today described as constant, moderate, burning left-sided chest pain radiating to the jaw and down the left hand. Admits to some associated diaphoresis today and shortness of breath. Denies prior cardiac work-up. Denies any fevers, chills, nausea/vomiting/diarrhea, lightheadedness, dizziness or changes in vision.    Pt was found to have NSTEMI in [facility] ER with normal cardiac markers and was sent to [facility] for cardiac cathter intervention.    (2023 16:35)      24 HOUR EVENTS:  s/p LHC with MARIE to mRCA  radial band removed without issue    REVIEW OF SYSTEMS:    CONSTITUTIONAL: No weakness, fevers or chills  EYES/ENT: No visual changes;  No vertigo or throat pain   NECK: No pain or stiffness  RESPIRATORY: No cough, wheezing, hemoptysis; No shortness of breath  CARDIOVASCULAR: No chest pain or palpitations  GASTROINTESTINAL: No abdominal or epigastric pain. No nausea, vomiting, or hematemesis; No diarrhea or constipation. No melena or hematochezia.  GENITOURINARY: No dysuria, frequency or hematuria  NEUROLOGICAL: No numbness or weakness  SKIN: No itching, rashes      ICU Vital Signs Last 24 Hrs  T(C): 37.1 (2023 19:30), Max: 37.1 (2023 19:30)  T(F): 98.8 (2023 19:30), Max: 98.8 (2023 19:30)  HR: 66 (2023 20:00) (54 - 96)  BP: 142/95 (2023 20:00) (125/100 - 185/95)  BP(mean): 114 (2023 20:00) (106 - 122)  RR: 28 (2023 20:00) (14 - 30)  SpO2: 100% (2023 20:00) (94% - 100%)    O2 Parameters below as of 2023 20:00  Patient On (Oxygen Delivery Method): room air        I&O's Summary    2023 07:01  -  2023 20:50  --------------------------------------------------------  IN: 150 mL / OUT: 200 mL / NET: -50 mL        CAPILLARY BLOOD GLUCOSE    CAPILLARY BLOOD GLUCOSE          ============================I/O===========================   I&O's Detail    2023 07:01  -  2023 20:50  --------------------------------------------------------  IN:    sodium chloride 0.9%: 150 mL  Total IN: 150 mL    OUT:    Voided (mL): 200 mL  Total OUT: 200 mL    Total NET: -50 mL        ============================ LABS =========================                        15.1   6.80  )-----------( 238      ( 2023 11:20 )             45.8     02-02    138  |  106  |  8   ----------------------------<  116<H>  4.0   |  22  |  0.60    Ca    9.5      2023 20:10  Phos  3.1     02-02  Mg     1.8     02-02    TPro  7.1  /  Alb  4.3  /  TBili  0.4  /  DBili  x   /  AST  22  /  ALT  11  /  AlkPhos  50      Troponin T, High Sensitivity Result: 95 ng/L (23 @ 20:10)    CKMB Units: 5.4 ng/mL (23 @ 20:10)  CKMB Units: <1.0 ng/mL (23 @ 11:20)    Creatine Kinase, Serum: 88 U/L (23 @ 20:10)  Creatine Kinase, Serum: 69 U/L (23 @ 11:20)    CPK Mass Assay %: <1.4 % (23 @ 11:20)        LIVER FUNCTIONS - ( 2023 20:10 )  Alb: 4.3 g/dL / Pro: 7.1 g/dL / ALK PHOS: 50 U/L / ALT: 11 U/L / AST: 22 U/L / GGT: x           PT/INR - ( 2023 11:28 )   PT: 11.4 sec;   INR: 0.96 ratio         PTT - ( 2023 11:28 )  PTT:27.2 sec        ======================Micro/Rad/Cardio=================  Telemtry: Reviewed   EKG: Reviewed  CXR: Reviewed  Culture: Reviewed   Echo:   Cath:   ======================================================  PAST MEDICAL & SURGICAL HISTORY:  No pertinent past medical history      HTN (hypertension)      GERD (gastroesophageal reflux disease)      History of Belcher&#x27;s esophagus      H/O:   ====================ASSESSMENT ==============  44yo F with PMHx of HTN, GERD with belcher's esophagus BIBEMS to [facility] presenting with chest pain x10 days presenting with NSTEMI s/p cardiac cath with Dr. Joya. mRCA found to be 99% occluded s/p MARIE.    ====================== NEUROLOGY=====================  A&Ox4  - continue to monitor mental status per protocol     ====================CARDIOVASCULAR==================  NSTEMI s/p RCA MARIE  - Continue ASA, Brilinta, Lipitor   - c/w lopressor 25 BID  - Trend CE until peak  - Check TTE  - will start losartan in AM  ==================== RESPIRATORY======================  Comfortable on RA  - Monitor SpO2 with goal >94%     Smoker  - Nicotine patch for smoking withdrawal  - smoking cessation education prior to d/c   ===================== RENAL =========================  SCr wnl  - continue to monitor and trend SCr, BUN, lytes, and I&os  - replete lytes prn with goal K 4-4.5 and Mg >2     ==================== GASTROINTESTINAL===================  - Diet: REG, DASH/TLC as tolerated   - Initiate home PPI 40mg Protonix qd for Belcher hx   - monitor for regular BM while inpatient     ========================INFECTIOUS DISEASE================  Afebrile, no leukocytosis  - continue to monitor WBC and temp curve     ===================HEMATOLOGIC/ONC ===================  h/h and plts wnl   Will initiate Heparin subQ DVT prophylaxis at midnight    =======================    ENDOCRINE  =====================  No indication for insulin at this time however glucose was 145 in ER will continue to monitor with routine labs. Check HA1C      ======================= DISPOSITION  =====================  Pt is stable at this time with anticipated d/c to floor tomorrow after overnight monitoring      Patient requires continuous monitoring with bedside rhythm monitoring, pulse ox monitoring, and intermittent blood gas analysis. Care plan discussed with ICU care team. Patient remained critical and at risk for life threatening decompensation.  Patient seen, examined and plan discussed with CCU team during rounds.      Patient requires continuous monitoring with bedside rhythm monitoring, pulse ox monitoring, and intermittent blood gas analysis. Care plan discussed with ICU care team. Patient remained critical and at risk for life threatening decompensation.  Patient seen, examined and plan discussed with CCU team during rounds.     I have personally provided _40__ minutes of critical care time excluding time spent on separate procedures, in addition to initial critical care time provided by the CICU Attending, Dr. Nagy.    By signing my name below, I, Aileen Jacques, attest that this documentation has been prepared under the direction and in the presence of RANJAN Christianson.  Electronically signed: Sharon Lin, 23 @ 20:50    I, Cheri Brown, personally performed the services described in this documentation. all medical record entries made by the scribe were at my direction and in my presence. I have reviewed the chart and agree that the record reflects my personal performance and is accurate and complete  Electronically signed: RANJAN Christianson.

## 2023-02-02 NOTE — H&P ADULT - NSICDXPASTSURGICALHX_GEN_ALL_CORE_FT
PAST SURGICAL HISTORY:  No significant past surgical history      PAST SURGICAL HISTORY:  H/O:

## 2023-02-02 NOTE — H&P ADULT - ASSESSMENT
42yo F with PMHx of HTN, GERD with belcher's esophagus BIBEMS to [facility] presenting with chest pain x10 days presenting with NSTEMI s/p cardiac cath with Dr. Joya. mRCA found to be 99% occluded s/p    ====================== NEUROLOGY=====================  A&Ox4  Recovering from post procedure sedation  Nicotine patch for smoking withdrawal      ====================CARDIOVASCULAR==================    ==Hemodynamics==  BP appropriate, continue to monitor (125/100 - 160/102)  HR low 60s, continue to monitor    ==Pump==    TTE Date:  LVEF:                              Regional Wall Motion Abnormailities?:  [ ]Yes   [ ] No   If Yes, Details  Diastolic function:  RV function:   Any change from prior?: [ ] Yes   [ ] No   If Yes, Details:   Volume status:   [ ] Hypovolemia    [ ] Euvolemic    [ ] Hypervolemic    ==Coronaries==    Last ischemic workup:   Antiplatelet regimen:   Anticoagulant:   Statin:   Beta blocker:    ==Rhythm==    Current rhythm:  AM EKG Interpretation:   Anti-arrhythmic therapies:   TVP with settings:       ==================== RESPIRATORY======================  Currently saturating 100% on RA, keep >90      ===================== RENAL =========================    02-02-23 @ 07:01  -  02-02-23 @ 17:07  --------------------------------------------------------  IN: 150 mL / OUT: 0 mL / NET: 150 mL      BUN/Cr: 11/0.88, normal, will monitor with routine labs      ==================== GASTROINTESTINAL===================    Diet: REG, DASH/TLC    Initiate home PPI 40mg Protonix qd    ========================INFECTIOUS DISEASE================  T(C): 36.9 (02-02-23 @ 16:02), Max: 36.9 (02-02-23 @ 16:02)  WBC Count: 6.80 K/uL (02-02-23 @ 11:20)    No current indication for abx. Will monitor leukocytosis with routine CBCs      ===================HEMATOLOGIC/ONC ===================  Hemoglobin: 15.1 g/dL (02-02-23 @ 11:20)    Platelet Count - Automated: 238 K/uL (02-02-23 @ 11:20)    Will initiate Heparin subQ DVT prophylaxis tomorrow AM ???????????    aspirin enteric coated 81 milliGRAM(s) Oral daily for maintenence    =======================    ENDOCRINE  =====================  No indication for insulin at this time however glucose was 145 in ER will continue to monitor with routine labs        atorvastatin 40 milliGRAM(s) Oral at bedtime    ======================= LINES/TUBES  =====================    Pt currently without lines. Access for cath obtained through R forearm    ======================= DISPOSITION  =====================  Pt is stable at this time with anticipated d/c to floor tomorrow after overnight monitoring    Patient requires continuous monitoring with bedside rhythm monitoring, pulse ox monitoring, and intermittent blood gas analysis. Care plan discussed with ICU care team. Patient remained critical and at risk for life threatening decompensation.  Patient seen, examined and plan discussed with CCU team during rounds.       42yo F with PMHx of HTN, GERD with belcher's esophagus BIBEMS to [facility] presenting with chest pain x10 days presenting with NSTEMI s/p cardiac cath with Dr. Joya. mRCA found to be 99% occluded s/p MARIE    ====================== NEUROLOGY=====================  A&Ox4  Recovering from post procedure sedation  Nicotine patch for smoking withdrawal      ====================CARDIOVASCULAR==================    ==Hemodynamics==  BP ,  (125/100 - 160/102) will start Metoprolol 12.5 BID   HR low 60s, continue to monitor    ==Pump==    Check TTE  ==Coronaries==    Last ischemic workup:   Antiplatelet regimen:   Anticoagulant:   Statin:   Beta blocker:    ==Rhythm==    Current rhythm:  AM EKG Interpretation:   Anti-arrhythmic therapies:   TVP with settings:       ==================== RESPIRATORY======================  Currently saturating 100% on RA, keep >90      ===================== RENAL =========================    02-02-23 @ 07:01  -  02-02-23 @ 17:07  --------------------------------------------------------  IN: 150 mL / OUT: 0 mL / NET: 150 mL      BUN/Cr: 11/0.88, normal, will monitor with routine labs      ==================== GASTROINTESTINAL===================    Diet: REG, DASH/TLC    Initiate home PPI 40mg Protonix qd    ========================INFECTIOUS DISEASE================  T(C): 36.9 (02-02-23 @ 16:02), Max: 36.9 (02-02-23 @ 16:02)  WBC Count: 6.80 K/uL (02-02-23 @ 11:20)    No current indication for abx. Will monitor leukocytosis with routine CBCs      ===================HEMATOLOGIC/ONC ===================  Hemoglobin: 15.1 g/dL (02-02-23 @ 11:20)    Platelet Count - Automated: 238 K/uL (02-02-23 @ 11:20)    Will initiate Heparin subQ DVT prophylaxis tomorrow AM ???????????    aspirin enteric coated 81 milliGRAM(s) Oral daily for maintenence    =======================    ENDOCRINE  =====================  No indication for insulin at this time however glucose was 145 in ER will continue to monitor with routine labs        atorvastatin 40 milliGRAM(s) Oral at bedtime    ======================= LINES/TUBES  =====================    Pt currently without lines. Access for cath obtained through R forearm    ======================= DISPOSITION  =====================  Pt is stable at this time with anticipated d/c to floor tomorrow after overnight monitoring    Patient requires continuous monitoring with bedside rhythm monitoring, pulse ox monitoring, and intermittent blood gas analysis. Care plan discussed with ICU care team. Patient remained critical and at risk for life threatening decompensation.  Patient seen, examined and plan discussed with CCU team during rounds.       42yo F with PMHx of HTN, GERD with belcher's esophagus BIBEMS to [facility] presenting with chest pain x10 days presenting with NSTEMI s/p cardiac cath with Dr. Joya. mRCA found to be 99% occluded s/p MARIE.    ====================== NEUROLOGY=====================  A&Ox4  Recovering from post procedure sedation  Nicotine patch for smoking withdrawal  - Bedrest until CE peak      ====================CARDIOVASCULAR==================  NSTEMI s/p RCA MARIE  - Continue ASA, Brilinta, Lipitor   BP ,  (125/100 - 160/102) will start Metoprolol 12.5 BID   HR low 60s, continue to monitor  - Trend CE  - Check TTE      ==================== RESPIRATORY======================  Currently saturating 100% on RA, keep >90      ===================== RENAL =========================    02-02-23 @ 07:01  -  02-02-23 @ 17:07  --------------------------------------------------------  IN: 150 mL / OUT: 0 mL / NET: 150 mL      BUN/Cr: 11/0.88, normal, will monitor with routine labs      ==================== GASTROINTESTINAL===================    Diet: REG, DASH/TLC    Initiate home PPI 40mg Protonix qd    ========================INFECTIOUS DISEASE================  T(C): 36.9 (02-02-23 @ 16:02), Max: 36.9 (02-02-23 @ 16:02)  WBC Count: 6.80 K/uL (02-02-23 @ 11:20)    No current indication for abx. Will monitor leukocytosis with routine CBCs      ===================HEMATOLOGIC/ONC ===================  Hemoglobin: 15.1 g/dL (02-02-23 @ 11:20)    Platelet Count - Automated: 238 K/uL (02-02-23 @ 11:20)    Will initiate Heparin subQ DVT prophylaxis at midnight    aspirin enteric coated 81 milliGRAM(s) Oral daily for maintenance    =======================    ENDOCRINE  =====================  No indication for insulin at this time however glucose was 145 in ER will continue to monitor with routine labs. Check HA1C        atorvastatin 40 milliGRAM(s) Oral at bedtime    ======================= LINES/TUBES  =====================    Pt currently without lines. Access for cath obtained through R radial artery    ======================= DISPOSITION  =====================  Pt is stable at this time with anticipated d/c to floor tomorrow after overnight monitoring    Patient requires continuous monitoring with bedside rhythm monitoring, pulse ox monitoring, and intermittent blood gas analysis. Care plan discussed with ICU care team. Patient remained critical and at risk for life threatening decompensation.  Patient seen, examined and plan discussed with CCU team during rounds.

## 2023-02-02 NOTE — ED PROVIDER NOTE - OBJECTIVE STATEMENT
42 y/o F w/ PMH of HLD, HTN, active smoker presenting w/ chest pain. Seen w/ daughter. BIBEMS. Reports for approx 10 days has been feeling left sided chest discomfort. Worse w/ activity. This morning pain acutely worsened. Still located along L sided of chest, now radiating up into throat. Described as burning sensation. Denies prior cardiac work up. Denies fevers, chills, headache, dizziness, blurred vision, cough, shortness of breath, abdominal pain, n/v/d/c, urinary symptoms, MSK pain, rash.

## 2023-02-02 NOTE — H&P ADULT - NSICDXFAMILYHX_GEN_ALL_CORE_FT
FAMILY HISTORY:  Mother  Still living? Unknown  FH: brain aneurysm, Age at diagnosis: 51-60    Grandparent  Still living? Unknown  Family history of early CAD, Age at diagnosis: 41-50

## 2023-02-02 NOTE — H&P ADULT - NSHPLABSRESULTS_GEN_ALL_CORE
ICU Vital Signs Last 24 Hrs  T(C): 36.9 (02 Feb 2023 16:02), Max: 36.9 (02 Feb 2023 16:02)  T(F): 98.5 (02 Feb 2023 16:02), Max: 98.5 (02 Feb 2023 16:02)  HR: 64 (02 Feb 2023 17:09) (54 - 96)  BP: 157/93 (02 Feb 2023 17:09) (125/100 - 160/102)  BP(mean): 119 (02 Feb 2023 17:09) (117 - 122)  ABP: --  ABP(mean): --  RR: 23 (02 Feb 2023 17:09) (14 - 30)  SpO2: 99% (02 Feb 2023 17:09) (94% - 100%)    O2 Parameters below as of 02 Feb 2023 17:09  Patient On (Oxygen Delivery Method): room air      15.1   6.80  )-----------( 238      ( 02 Feb 2023 11:20 )             45.8       02-02    143  |  110<H>  |  11  ----------------------------<  145<H>  3.6   |  22  |  0.88    Ca    9.5      02 Feb 2023 11:20  Mg     1.9     02-02    TPro  8.0  /  Alb  4.0  /  TBili  0.3  /  DBili  x   /  AST  21  /  ALT  21  /  AlkPhos  54  02-02          Magnesium, Serum: 1.9 mg/dL (02-02-23 @ 11:20)      PT/INR - ( 02 Feb 2023 11:28 )   PT: 11.4 sec;   INR: 0.96 ratio         PTT - ( 02 Feb 2023 11:28 )  PTT:27.2 sec    Lactate Trend      CARDIAC MARKERS ( 02 Feb 2023 11:20 )  x     / x     / 69 U/L / x     / <1.0 ng/mL ICU Vital Signs Last 24 Hrs  T(C): 36.9 (02 Feb 2023 16:02), Max: 36.9 (02 Feb 2023 16:02)  T(F): 98.5 (02 Feb 2023 16:02), Max: 98.5 (02 Feb 2023 16:02)  HR: 64 (02 Feb 2023 17:09) (54 - 96)  BP: 157/93 (02 Feb 2023 17:09) (125/100 - 160/102)  BP(mean): 119 (02 Feb 2023 17:09) (117 - 122)  RR: 23 (02 Feb 2023 17:09) (14 - 30)  SpO2: 99% (02 Feb 2023 17:09) (94% - 100%)    O2 Parameters below as of 02 Feb 2023 17:09  Patient On (Oxygen Delivery Method): room air      15.1   6.80  )-----------( 238      ( 02 Feb 2023 11:20 )             45.8       02-02    143  |  110<H>  |  11  ----------------------------<  145<H>  3.6   |  22  |  0.88    Ca    9.5      02 Feb 2023 11:20  Mg     1.9     02-02    TPro  8.0  /  Alb  4.0  /  TBili  0.3  /  DBili  x   /  AST  21  /  ALT  21  /  AlkPhos  54  02-02          Magnesium, Serum: 1.9 mg/dL (02-02-23 @ 11:20)      PT/INR - ( 02 Feb 2023 11:28 )   PT: 11.4 sec;   INR: 0.96 ratio         PTT - ( 02 Feb 2023 11:28 )  PTT:27.2 sec    Lactate Trend      CARDIAC MARKERS ( 02 Feb 2023 11:20 )  x     / x     / 69 U/L / x     / <1.0 ng/mL

## 2023-02-02 NOTE — H&P ADULT - HISTORY OF PRESENT ILLNESS
44yo F with PMHx of HTN, GERD with belcher's esophagus BIBEMS to [facility] presenting with chest pain x10 days. Pt states she initially thought pain was 2/2 reflux/GERD and tried taking PPIs, pepcid and Mylanta without relief of sx's, with increased activity. Pt states pain was more severe today described as constant, moderate, burning left-sided chest pain radiating to the jaw and down the left hand. Admits to some associated diaphoresis today and shortness of breath. Denies prior cardiac work-up. Denies any fevers, chills, nausea/vomiting/diarrhea, lightheadedness, dizziness or changes in vision.    [facility] and was sent to [facility] for cardiac cathter intervention.    44yo F with PMHx of HTN, GERD with belcher's esophagus BIBEMS to [facility] presenting with chest pain x10 days. Pt states she initially thought pain was 2/2 reflux/GERD and tried taking PPIs, pepcid and Mylanta without relief of sx's, Pain was worse with increased activity. Pt states pain was more severe today described as constant, moderate, burning left-sided chest pain radiating to the jaw and down the left hand. Admits to some associated diaphoresis today and shortness of breath. Denies prior cardiac work-up. Denies any fevers, chills, nausea/vomiting/diarrhea, lightheadedness, dizziness or changes in vision.    Pt was found to have NSTEMI in [facility] ER with normal cardiac markers and was sent to [facility] for cardiac cathter intervention.

## 2023-02-02 NOTE — H&P ADULT - NSICDXPASTMEDICALHX_GEN_ALL_CORE_FT
PAST MEDICAL HISTORY:  GERD (gastroesophageal reflux disease)     History of Last's esophagus     HTN (hypertension)     No pertinent past medical history

## 2023-02-02 NOTE — H&P ADULT - CRITICAL CARE ATTENDING COMMENT
Admitted with NSTEMI s/p PCI  DAPT with ASA, Ticagrelor; Lipitor 40  Hemodynamics acceptable, chest pain free - start beta blocker  Check TTE  Trend cardiac enzymes until peak  O2 sats mid to high 90s on room air  Normal renal function, compensated on exam - target even   H/H acceptable  COVID negative, no antibiotics   Sugars controlled, check Hgb A1c   No central access

## 2023-02-02 NOTE — H&P ADULT - NSHPREVIEWOFSYSTEMS_GEN_ALL_CORE
Constitutional: +diaphoresis. Denies fever, fatigue or weight loss.  Skin: Denies rash.  Eyes: Denies recent vision problems or eye pain.  ENT: Denies congestion, ear pain, or sore throat.  Endocrine: Denies thyroid problems.  Cardiovascular: +chest pain denies palpation.  Respiratory: +Shortness of breath. Denies cough, congestion, or wheezing.  Gastrointestinal: Denies abdominal pain, nausea, vomiting, or diarrhea.  Genitourinary: Denies dysuria.  Musculoskeletal: Denies joint swelling.  Neurologic: Denies headache.

## 2023-02-03 ENCOUNTER — TRANSCRIPTION ENCOUNTER (OUTPATIENT)
Age: 43
End: 2023-02-03

## 2023-02-03 VITALS
SYSTOLIC BLOOD PRESSURE: 155 MMHG | RESPIRATION RATE: 20 BRPM | HEART RATE: 76 BPM | OXYGEN SATURATION: 98 % | DIASTOLIC BLOOD PRESSURE: 94 MMHG

## 2023-02-03 LAB
A1C WITH ESTIMATED AVERAGE GLUCOSE RESULT: 5.1 % — SIGNIFICANT CHANGE UP (ref 4–5.6)
ALBUMIN SERPL ELPH-MCNC: 4.1 G/DL — SIGNIFICANT CHANGE UP (ref 3.3–5)
ALP SERPL-CCNC: 51 U/L — SIGNIFICANT CHANGE UP (ref 40–120)
ALT FLD-CCNC: 10 U/L — SIGNIFICANT CHANGE UP (ref 10–45)
ANION GAP SERPL CALC-SCNC: 10 MMOL/L — SIGNIFICANT CHANGE UP (ref 5–17)
APTT BLD: 27.5 SEC — SIGNIFICANT CHANGE UP (ref 27.5–35.5)
AST SERPL-CCNC: 26 U/L — SIGNIFICANT CHANGE UP (ref 10–40)
BASOPHILS # BLD AUTO: 0.04 K/UL — SIGNIFICANT CHANGE UP (ref 0–0.2)
BASOPHILS NFR BLD AUTO: 0.5 % — SIGNIFICANT CHANGE UP (ref 0–2)
BILIRUB SERPL-MCNC: 0.4 MG/DL — SIGNIFICANT CHANGE UP (ref 0.2–1.2)
BLD GP AB SCN SERPL QL: NEGATIVE — SIGNIFICANT CHANGE UP
BUN SERPL-MCNC: 10 MG/DL — SIGNIFICANT CHANGE UP (ref 7–23)
CALCIUM SERPL-MCNC: 9.4 MG/DL — SIGNIFICANT CHANGE UP (ref 8.4–10.5)
CHLORIDE SERPL-SCNC: 105 MMOL/L — SIGNIFICANT CHANGE UP (ref 96–108)
CHOLEST SERPL-MCNC: 175 MG/DL — SIGNIFICANT CHANGE UP
CK MB CFR SERPL CALC: 5.1 NG/ML — HIGH (ref 0–3.8)
CK SERPL-CCNC: 92 U/L — SIGNIFICANT CHANGE UP (ref 25–170)
CO2 SERPL-SCNC: 23 MMOL/L — SIGNIFICANT CHANGE UP (ref 22–31)
CREAT SERPL-MCNC: 0.75 MG/DL — SIGNIFICANT CHANGE UP (ref 0.5–1.3)
EGFR: 101 ML/MIN/1.73M2 — SIGNIFICANT CHANGE UP
EOSINOPHIL # BLD AUTO: 0.08 K/UL — SIGNIFICANT CHANGE UP (ref 0–0.5)
EOSINOPHIL NFR BLD AUTO: 1 % — SIGNIFICANT CHANGE UP (ref 0–6)
ESTIMATED AVERAGE GLUCOSE: 100 MG/DL — SIGNIFICANT CHANGE UP (ref 68–114)
GLUCOSE SERPL-MCNC: 88 MG/DL — SIGNIFICANT CHANGE UP (ref 70–99)
HCT VFR BLD CALC: 40.9 % — SIGNIFICANT CHANGE UP (ref 34.5–45)
HDLC SERPL-MCNC: 43 MG/DL — LOW
HGB BLD-MCNC: 13.6 G/DL — SIGNIFICANT CHANGE UP (ref 11.5–15.5)
IMM GRANULOCYTES NFR BLD AUTO: 0.4 % — SIGNIFICANT CHANGE UP (ref 0–0.9)
INR BLD: 1.14 RATIO — SIGNIFICANT CHANGE UP (ref 0.88–1.16)
LIPID PNL WITH DIRECT LDL SERPL: 120 MG/DL — HIGH
LYMPHOCYTES # BLD AUTO: 2.21 K/UL — SIGNIFICANT CHANGE UP (ref 1–3.3)
LYMPHOCYTES # BLD AUTO: 26.4 % — SIGNIFICANT CHANGE UP (ref 13–44)
MAGNESIUM SERPL-MCNC: 2.1 MG/DL — SIGNIFICANT CHANGE UP (ref 1.6–2.6)
MCHC RBC-ENTMCNC: 27.7 PG — SIGNIFICANT CHANGE UP (ref 27–34)
MCHC RBC-ENTMCNC: 33.3 GM/DL — SIGNIFICANT CHANGE UP (ref 32–36)
MCV RBC AUTO: 83.3 FL — SIGNIFICANT CHANGE UP (ref 80–100)
MONOCYTES # BLD AUTO: 0.92 K/UL — HIGH (ref 0–0.9)
MONOCYTES NFR BLD AUTO: 11 % — SIGNIFICANT CHANGE UP (ref 2–14)
NEUTROPHILS # BLD AUTO: 5.09 K/UL — SIGNIFICANT CHANGE UP (ref 1.8–7.4)
NEUTROPHILS NFR BLD AUTO: 60.7 % — SIGNIFICANT CHANGE UP (ref 43–77)
NON HDL CHOLESTEROL: 133 MG/DL — HIGH
NRBC # BLD: 0 /100 WBCS — SIGNIFICANT CHANGE UP (ref 0–0)
PHOSPHATE SERPL-MCNC: 3.7 MG/DL — SIGNIFICANT CHANGE UP (ref 2.5–4.5)
PLATELET # BLD AUTO: 193 K/UL — SIGNIFICANT CHANGE UP (ref 150–400)
POTASSIUM SERPL-MCNC: 4 MMOL/L — SIGNIFICANT CHANGE UP (ref 3.5–5.3)
POTASSIUM SERPL-SCNC: 4 MMOL/L — SIGNIFICANT CHANGE UP (ref 3.5–5.3)
PROT SERPL-MCNC: 7.1 G/DL — SIGNIFICANT CHANGE UP (ref 6–8.3)
PROTHROM AB SERPL-ACNC: 13.1 SEC — SIGNIFICANT CHANGE UP (ref 10.5–13.4)
RBC # BLD: 4.91 M/UL — SIGNIFICANT CHANGE UP (ref 3.8–5.2)
RBC # FLD: 13.3 % — SIGNIFICANT CHANGE UP (ref 10.3–14.5)
RH IG SCN BLD-IMP: POSITIVE — SIGNIFICANT CHANGE UP
SODIUM SERPL-SCNC: 138 MMOL/L — SIGNIFICANT CHANGE UP (ref 135–145)
TRIGL SERPL-MCNC: 61 MG/DL — SIGNIFICANT CHANGE UP
TROPONIN T, HIGH SENSITIVITY RESULT: 85 NG/L — HIGH (ref 0–51)
TSH SERPL-MCNC: 2.18 UIU/ML — SIGNIFICANT CHANGE UP (ref 0.27–4.2)
WBC # BLD: 8.37 K/UL — SIGNIFICANT CHANGE UP (ref 3.8–10.5)
WBC # FLD AUTO: 8.37 K/UL — SIGNIFICANT CHANGE UP (ref 3.8–10.5)

## 2023-02-03 PROCEDURE — 93005 ELECTROCARDIOGRAM TRACING: CPT

## 2023-02-03 PROCEDURE — 84100 ASSAY OF PHOSPHORUS: CPT

## 2023-02-03 PROCEDURE — 82550 ASSAY OF CK (CPK): CPT

## 2023-02-03 PROCEDURE — 80053 COMPREHEN METABOLIC PANEL: CPT

## 2023-02-03 PROCEDURE — 86901 BLOOD TYPING SEROLOGIC RH(D): CPT

## 2023-02-03 PROCEDURE — C9606: CPT | Mod: RC

## 2023-02-03 PROCEDURE — 80061 LIPID PANEL: CPT

## 2023-02-03 PROCEDURE — 86850 RBC ANTIBODY SCREEN: CPT

## 2023-02-03 PROCEDURE — C1887: CPT

## 2023-02-03 PROCEDURE — 84443 ASSAY THYROID STIM HORMONE: CPT

## 2023-02-03 PROCEDURE — C1769: CPT

## 2023-02-03 PROCEDURE — 83036 HEMOGLOBIN GLYCOSYLATED A1C: CPT

## 2023-02-03 PROCEDURE — C1725: CPT

## 2023-02-03 PROCEDURE — 85610 PROTHROMBIN TIME: CPT

## 2023-02-03 PROCEDURE — 93454 CORONARY ARTERY ANGIO S&I: CPT | Mod: 59

## 2023-02-03 PROCEDURE — C1894: CPT

## 2023-02-03 PROCEDURE — 93306 TTE W/DOPPLER COMPLETE: CPT | Mod: 26

## 2023-02-03 PROCEDURE — 86900 BLOOD TYPING SEROLOGIC ABO: CPT

## 2023-02-03 PROCEDURE — 82553 CREATINE MB FRACTION: CPT

## 2023-02-03 PROCEDURE — 85730 THROMBOPLASTIN TIME PARTIAL: CPT

## 2023-02-03 PROCEDURE — 83735 ASSAY OF MAGNESIUM: CPT

## 2023-02-03 PROCEDURE — 84484 ASSAY OF TROPONIN QUANT: CPT

## 2023-02-03 PROCEDURE — 93010 ELECTROCARDIOGRAM REPORT: CPT

## 2023-02-03 PROCEDURE — 99239 HOSP IP/OBS DSCHRG MGMT >30: CPT | Mod: 25

## 2023-02-03 PROCEDURE — 85025 COMPLETE CBC W/AUTO DIFF WBC: CPT

## 2023-02-03 PROCEDURE — C1874: CPT

## 2023-02-03 PROCEDURE — 85520 HEPARIN ASSAY: CPT

## 2023-02-03 PROCEDURE — 99233 SBSQ HOSP IP/OBS HIGH 50: CPT

## 2023-02-03 PROCEDURE — C8929: CPT

## 2023-02-03 RX ORDER — METOPROLOL TARTRATE 50 MG
1 TABLET ORAL
Qty: 30 | Refills: 0
Start: 2023-02-03

## 2023-02-03 RX ORDER — ATORVASTATIN CALCIUM 80 MG/1
1 TABLET, FILM COATED ORAL
Qty: 30 | Refills: 0
Start: 2023-02-03

## 2023-02-03 RX ORDER — TICAGRELOR 90 MG/1
1 TABLET ORAL
Qty: 60 | Refills: 0
Start: 2023-02-03 | End: 2023-03-04

## 2023-02-03 RX ORDER — METOPROLOL TARTRATE 50 MG
100 TABLET ORAL DAILY
Refills: 0 | Status: DISCONTINUED | OUTPATIENT
Start: 2023-02-04 | End: 2023-02-03

## 2023-02-03 RX ORDER — METOPROLOL TARTRATE 50 MG
25 TABLET ORAL
Refills: 0 | Status: DISCONTINUED | OUTPATIENT
Start: 2023-02-03 | End: 2023-02-03

## 2023-02-03 RX ORDER — ATORVASTATIN CALCIUM 80 MG/1
1 TABLET, FILM COATED ORAL
Qty: 30 | Refills: 0
Start: 2023-02-03 | End: 2023-03-04

## 2023-02-03 RX ORDER — METOPROLOL TARTRATE 50 MG
50 TABLET ORAL DAILY
Refills: 0 | Status: DISCONTINUED | OUTPATIENT
Start: 2023-02-03 | End: 2023-02-03

## 2023-02-03 RX ORDER — METOPROLOL TARTRATE 50 MG
50 TABLET ORAL ONCE
Refills: 0 | Status: COMPLETED | OUTPATIENT
Start: 2023-02-03 | End: 2023-02-03

## 2023-02-03 RX ORDER — LOSARTAN POTASSIUM 100 MG/1
1 TABLET, FILM COATED ORAL
Qty: 30 | Refills: 0
Start: 2023-02-03

## 2023-02-03 RX ORDER — METOPROLOL TARTRATE 50 MG
1 TABLET ORAL
Qty: 0 | Refills: 0 | DISCHARGE

## 2023-02-03 RX ORDER — LOSARTAN POTASSIUM 100 MG/1
25 TABLET, FILM COATED ORAL DAILY
Refills: 0 | Status: DISCONTINUED | OUTPATIENT
Start: 2023-02-03 | End: 2023-02-03

## 2023-02-03 RX ORDER — AMLODIPINE BESYLATE 2.5 MG/1
1 TABLET ORAL
Qty: 0 | Refills: 0 | DISCHARGE

## 2023-02-03 RX ORDER — ASPIRIN/CALCIUM CARB/MAGNESIUM 324 MG
1 TABLET ORAL
Qty: 30 | Refills: 0
Start: 2023-02-03

## 2023-02-03 RX ADMIN — Medication 50 MILLIGRAM(S): at 06:10

## 2023-02-03 RX ADMIN — PANTOPRAZOLE SODIUM 40 MILLIGRAM(S): 20 TABLET, DELAYED RELEASE ORAL at 06:09

## 2023-02-03 RX ADMIN — TICAGRELOR 90 MILLIGRAM(S): 90 TABLET ORAL at 06:09

## 2023-02-03 RX ADMIN — LOSARTAN POTASSIUM 25 MILLIGRAM(S): 100 TABLET, FILM COATED ORAL at 06:09

## 2023-02-03 RX ADMIN — Medication 1 PATCH: at 11:47

## 2023-02-03 RX ADMIN — Medication 81 MILLIGRAM(S): at 11:47

## 2023-02-03 RX ADMIN — Medication 50 MILLIGRAM(S): at 11:47

## 2023-02-03 NOTE — DISCHARGE NOTE PROVIDER - NSDCMRMEDTOKEN_GEN_ALL_CORE_FT
aspirin 81 mg oral delayed release tablet: 1 tab(s) orally once a day  atorvastatin 40 mg oral tablet: 1 tab(s) orally once a day (at bedtime)  Brilinta (ticagrelor) 90 mg oral tablet: 1 tab(s) orally 2 times a day   losartan 25 mg oral tablet: 1 tab(s) orally once a day  metoprolol succinate 100 mg oral tablet, extended release: 1 tab(s) orally once a day  omeprazole 40 mg oral delayed release capsule: 1 cap(s) orally once a day  Pepcid 20 mg oral tablet: 1 tab(s) orally once a day (at bedtime), As Needed   aspirin 81 mg oral delayed release tablet: 1 tab(s) orally once a day  atorvastatin 80 mg oral tablet: 1 tab(s) orally once a day   Brilinta (ticagrelor) 90 mg oral tablet: 1 tab(s) orally 2 times a day   losartan 25 mg oral tablet: 1 tab(s) orally once a day  metoprolol succinate 100 mg oral tablet, extended release: 1 tab(s) orally once a day  omeprazole 40 mg oral delayed release capsule: 1 cap(s) orally once a day  Pepcid 20 mg oral tablet: 1 tab(s) orally once a day (at bedtime), As Needed

## 2023-02-03 NOTE — PROGRESS NOTE ADULT - NS ATTEND AMEND GEN_ALL_CORE FT
Admitted with NSTEMI s/p PCI  DAPT with ASA, Ticagrelor; Lipitor 40  Hemodynamics acceptable, chest pain free - titrate up beta blocker, continue Losartan  Stop outpatient Norvasc  TTE which I read as preserved LVEF  O2 sats mid to high 90s on room air  Normal renal function, compensated on exam - target even   H/H acceptable  Afebrile, no antibiotics   Sugars controlled  No central access  OOB/ambulate

## 2023-02-03 NOTE — DISCHARGE NOTE PROVIDER - HOSPITAL COURSE
42yo F with PMHx of HTN, GERD with belcher's esophagus BIBEMS to [facility] presenting with chest pain x10 days. Pt states she initially thought pain was 2/2 reflux/GERD and tried taking PPIs, pepcid and Mylanta without relief of sx's, Pain was worse with increased activity. Pt states pain was more severe today described as constant, moderate, burning left-sided chest pain radiating to the jaw and down the left hand. Admits to some associated diaphoresis today and shortness of breath. Denies prior cardiac work-up. Denies any fevers, chills, nausea/vomiting/diarrhea, lightheadedness, dizziness or changes in vision. Pt was found to have NSTEMI in [facility] ER with normal cardiac markers and was sent to [facility] for cardiac cathter intervention found to have 99% RCA stenosis now s/p mRCA MARIE.    (02 Feb 2023 16:35)    Pt came to ICU and was started on goal directed medical therapy. ECHO revealed EF of 55% and pt was ambulating without complaints. Pt stable for dc per cardiology team.

## 2023-02-03 NOTE — PROGRESS NOTE ADULT - SUBJECTIVE AND OBJECTIVE BOX
Patient is a 43y old  Female who presents with a chief complaint of NSTEMI (2023 06:56)    Pt was seen at bedside, denies chest pain, SOB     Tele: SB, SR 50-60's     Allergies    eggs (Hives)  No Known Drug Allergies    Intolerances    Medications:  aspirin enteric coated 81 milliGRAM(s) Oral daily  atorvastatin 40 milliGRAM(s) Oral at bedtime  losartan 25 milliGRAM(s) Oral daily  metoprolol succinate ER 50 milliGRAM(s) Oral daily  nicotine - 21 mG/24Hr(s) Patch 1 Patch Transdermal daily  pantoprazole    Tablet 40 milliGRAM(s) Oral before breakfast  ticagrelor 90 milliGRAM(s) Oral every 12 hours      Vitals:  T(C): 37 (23 @ 06:00), Max: 37.1 (23 @ 19:30)  HR: 73 (23 @ 07:00) (54 - 96)  BP: 121/76 (23 @ 07:00) (121/76 - 185/95)  BP(mean): 94 (23 @ 07:00) (94 - 122)  RR: 18 (23 @ 07:00) (14 - 30)  SpO2: 99% (23 @ 07:00) (94% - 100%)  Wt(kg): --  Daily Height in cm: 165.1 (2023 16:02)    Daily Weight in k.3 (2023 06:00)  I&O's Summary    2023 07:01  -  2023 07:00  --------------------------------------------------------  IN: 440 mL / OUT: 500 mL / NET: -60 mL    Review of System:   Denies chest pain, SOB, dizziness or palpitation  All other systems: no other complaints offered    Physical Exam:  Appearance: Normal  Eyes: PERRL, EOMI  HENT: Normal oral mus[place]a, NC/AT  Cardiovascular: S1S2, RRR, No M/R/G, no JVD, No Lower extremity edema  Procedural Right Radial Access Site: No hematoma, Mild Non-tender to palpation, 2+ pulse, No bruit, No Ecchymosis  Respiratory: Clear to auscultation bilaterally  Gastrointestinal: Soft, Non tender, Normal Bowel Sounds  Musculoskeletal: No clubbing, No joint deformity   Neurologic: Non-focal  Lymphatic: No lymphadenopathy  Psychiatry: AAOx3, Mood & affect appropriate  Skin: No rashes, No ecchymoses, No cyanosis        138  |  105  |  10  ----------------------------<  88  4.0   |  23  |  0.75    Ca    9.4      2023 03:57  Phos  3.7       Mg     2.1     -    TPro  7.1  /  Alb  4.1  /  TBili  0.4  /  DBili  x   /  AST  26  /  ALT  10  /  AlkPhos  51  -    PT/INR - ( 2023 03:57 )   PT: 13.1 sec;   INR: 1.14 ratio         PTT - ( 2023 03:57 )  PTT:27.5 sec  CARDIAC MARKERS ( 2023 03:57 )  x     / x     / 92 U/L / x     / 5.1 ng/mL  CARDIAC MARKERS ( 2023 20:10 )  x     / x     / 88 U/L / x     / 5.4 ng/mL  CARDIAC MARKERS ( 2023 11:20 )  x     / x     / 69 U/L / x     / <1.0 ng/mL    Lipid panel   Hgb A1c   ECG:  Echo: pending results    Stress Testing:     Cath:  < from: Cardiac Catheterization (23 @ 12:09) >  Indications:               Myocardial infarction with ST elevation (STEMI)  Lab Visit Indication:      ACS less than or equal to 24 hours   PCI Status: emergent  Coronary Angiography   The coronary circulation is right dominant.    LM: Left main artery: Angiography shows minor irregularities.    LAD: Left anterior descending artery: Angiography shows minor irregularities.  CX: Circumflex: Angiography shows minor irregularities.   RCA: Mid right coronary artery: There is a 99 % stenosis.    Conclusions:   STEMI due to mRCA 99% stenosis.  Successful PCI to mRCA with MARIE.   Recommendations:   DAPT for 12 months. Smoking cessation.  Risk factor reduction and modification. < end of copied text >    Imaging:

## 2023-02-03 NOTE — PROGRESS NOTE ADULT - ASSESSMENT
42 yo F with PMHx of HTN, GERD with belcher's esophagus, smoker BIBEMS to [facility] ER presenting with chest pain x10 days and worsening in 1-2 days,  found to have NSTEMI. s/p cardiac cath with Dr. Joya. s/p mRCA MARIE (99%), now chest pain free, denies SOB, dizziness.     - Continue ASA, Brilinta,   - Continue Statin   - Continue beta blocker  - Smoking cessation (pt agreed)  - TTE done pending results    - Recommend a heart healthy diet which includes a variety of fruits and vegetables, whole grains, low fat dairy products, legumes and skinless poultry and fish; food prepared with little or no salt and minimize processed foods  - Avoid using NSAIDs (Aleve, Motrin, ibuprofen, naproxen) while on DAPT, please utilize Tylenol for pain control (not to exceed 4gm in 24 hours)  - Keep Potassium> 4.0 and Magnesium>2.0  - All other issues as per primary team  - Followup with outpatient cardiologist 1-2 weeks after discharge (list of name given)   - discharge instruction discussed with pt.   Pt understood and verbalized (Benefit and risk of ASA/Brilinta, diet, activity, site care, f/u care and reportable S/S)         Ximena Langford, [facility]-BC   #[phone] or team

## 2023-02-03 NOTE — DISCHARGE NOTE NURSING/CASE MANAGEMENT/SOCIAL WORK - NSDCPEFALRISK_GEN_ALL_CORE
For information on Fall & Injury Prevention, visit: [email or web address] OR  [email or web address] OR  https://www.cdc.gov/steadi/patient.html

## 2023-02-03 NOTE — DISCHARGE NOTE NURSING/CASE MANAGEMENT/SOCIAL WORK - PATIENT PORTAL LINK FT
You can access the FollowMyHealth Patient Portal offered by [facility] by registering at the following website: [email or web address]. By joining [employer]’s FollowMyHealth portal, you will also be able to view your health information using other applications (apps) compatible with our system.

## 2023-02-03 NOTE — DISCHARGE NOTE PROVIDER - CARE PROVIDER_API CALL
Maria Luz Joya (DO)  Cardiology; Internal Medicine  [address] [place] [address]  Phone: [phone]  Fax: [phone]  Follow Up Time: 2 weeks

## 2023-02-03 NOTE — DISCHARGE NOTE PROVIDER - NSDCCPTREATMENT_GEN_ALL_CORE_FT
PRINCIPAL PROCEDURE  Procedure: PCI, with stent insertion  Findings and Treatment: mRCA, 99% occ

## 2023-02-03 NOTE — DISCHARGE NOTE PROVIDER - NSDCCPCAREPLAN_GEN_ALL_CORE_FT
PRINCIPAL DISCHARGE DIAGNOSIS  Diagnosis: NSTEMI (non-ST elevation myocardial infarction)  Assessment and Plan of Treatment: You should ambulate as much as possible and rest when you become tired. Exercise will strengthen your heart muscle so that blood can pump throughout your body. Limit any strenuous exercise or heavy lifting as your heart muscle is still recovering from experiencing a heart attack.Ask your cardiologist if it is safe to return to other activity you may want to pursue during your first follow up visit. Continue to maintain a heart healthy diet as described above. This means not having any concentrated sweets and eating whole grain carbohydrates like brown rice or whole wheat bread.

## 2023-02-03 NOTE — PROGRESS NOTE ADULT - ASSESSMENT
42yo F with PMHx of HTN, GERD with belcher's esophagus BIBEMS to [facility] presenting with chest pain x10 days presenting with NSTEMI s/p cardiac cath with Dr. Joya. mRCA found to be 99% occluded s/p MARIE.    ====================CARDIOVASCULAR==================    Mechanical Circulatory Support:  [ ] IABP   [ ] Impella 2.5   [ ] Impella CP  Settings:  Augmented Diastolic Pressure:  Impella Flow:     ==Hemodynamics==     (Date) CVP:      PCWP:        PA S/D:            Cardiac Output:             Cardiac Index:            SVR:    Preload (fluids, diuretics):  Afterload (anti-hypertensives, pressors):  Inotropes:    ==Pump==    TTE Date:  LVEF:                              Regional Wall Motion Abnormailities?:  [ ]Yes   [ ] No   If Yes, Details  Diastolic function:  RV function:   Any change from prior?: [ ] Yes   [ ] No   If Yes, Details:   Volume status:   [ ] Hypovolemia    [ ] Euvolemic    [ ] Hypervolemic    ==Coronaries==    Last ischemic workup:   Antiplatelet regimen:   Anticoagulant:   Statin:   Beta blocker:    ==Rhythm==    Current rhythm:  AM EKG Interpretation:   Anti-arrhythmic therapies:   TVP with settings:     losartan 25 milliGRAM(s) Oral daily  metoprolol succinate ER 50 milliGRAM(s) Oral daily      ====================== NEUROLOGY=====================  Sedation [ ]Yes   [ ] No   If Yes, Medication/Dose:   CAM ICU [ ] Positive  [ ] Negative      ==================== RESPIRATORY======================  [ ] Invasive Mechanical Ventilation   [ ] BIPAP    [ ] HFNC    [ ] NC   Non-invasive Mechanical Ventilation/ HFNC Settings:               ===================== RENAL =========================    02-02-23 @ 07:01  -  02-03-23 @ 06:56  --------------------------------------------------------  IN: 440 mL / OUT: 500 mL / NET: -60 mL      Renal Replacement Therapy:  [ ] CRRT      [ ] IHD   Last Session:    Fluid removal:     [ ] Diuretic therapy, Regimen:       ==================== GASTROINTESTINAL===================    Diet:  Last BM:   Indication for Stress Ulcer Prophylaxis, [ ] Yes    [ ] No   If Yes, Medication:     pantoprazole    Tablet 40 milliGRAM(s) Oral before breakfast    ========================INFECTIOUS DISEASE================  T(C): 37 (02-03-23 @ 06:00), Max: 37.1 (02-02-23 @ 19:30)  WBC Count: 8.37 K/uL (02-03-23 @ 03:57)  WBC Count: 6.80 K/uL (02-02-23 @ 11:20)        Current Antibiotics/Antifungals/ Antivirals with start date:       ===================HEMATOLOGIC/ONC ===================  Hemoglobin: 13.6 g/dL (02-03-23 @ 03:57)  Hemoglobin: 15.1 g/dL (02-02-23 @ 11:20)    Platelet Count - Automated: 193 K/uL (02-03-23 @ 03:57)  Platelet Count - Automated: 238 K/uL (02-02-23 @ 11:20)    Chemical VTE Prophylaxis:  [ ] Lovenox    [ ] SQH   [ ]NA  Systemic Anticogaulation:  [ ] Yes    [ ] No,  If Yes, Medication and Indication:     aspirin enteric coated 81 milliGRAM(s) Oral daily  heparin   Injectable 5000 Unit(s) SubCutaneous every 8 hours  ticagrelor 90 milliGRAM(s) Oral every 12 hours    =======================    ENDOCRINE  =====================  Insulin drip  [ ] Yes    [ ] No  Basal Insulin [ ] Yes    [ ] No, Details:   Bolus insulin [ ] Yes    [ ] No  Sliding Scale  [ ] Yes    [ ] No          atorvastatin 40 milliGRAM(s) Oral at bedtime    ======================= LINES/TUBES  =====================  [place]: (Date placed)  Central Line: (Date placed)  HD Catheter: (Date placed)  Arterial Line: (Date placed)  Endotracheal Tube: (Date placed)  [place]: (Date placed)  ======================= DISPOSITION  =====================  [ ] Critical   [ ] Guarded    [ ] Stable    [ ] Maintain in CICU  [ ] Downgrade to [place]  [ ] Discharge Home    Patient requires continuous monitoring with bedside rhythm monitoring, pulse ox monitoring, and intermittent blood gas analysis. Care plan discussed with ICU care team. Patient remained critical and at risk for life threatening decompensation.  Patient seen, examined and plan discussed with CCU team during rounds.     I have personally provided ____ minutes of critical care time excluding time spent on separate procedures, in addition to initial critical care time provided by the CICU Attending, Dr. Nagy/ Mahnaz/ Derrek/ Samantha/ Luli/ Liam .  44yo F with PMHx of HTN, GERD with belcher's esophagus BIBEMS to [facility] presenting with chest pain x10 days presenting with NSTEMI s/p cardiac cath with Dr. Joya. mRCA found to be 99% occluded s/p MARIE.    ====================== NEUROLOGY=====================  A&Ox4  Recovering from post procedure sedation  Nicotine patch for smoking withdrawal  - pt may ambulate      ====================CARDIOVASCULAR==================  NSTEMI s/p RCA MARIE  - Continue ASA, Brilinta, Lipitor   BP,  140s/70s resumed on metoprolol succinate 50 qd, losartan 25mg qd  HR 60s- 70s, continue to monitor  - CE peaked and now downtrending  - TTE this AM      ==================== RESPIRATORY======================  Currently saturating 100% on RA, keep >90      ===================== RENAL =========================    02-02-23 @ 07:01  - 02-02-23 @ 17:07  --------------------------------------------------------  IN: 150 mL / OUT: 0 mL / NET: 150 mL      BUN/Cr: 10/0.75, normal, will monitor with routine labs      ==================== GASTROINTESTINAL===================    Diet: REG, DASH/TLC    Initiate home PPI 40mg Protonix qd    ========================INFECTIOUS DISEASE================  T(C): 36.9 (02-02-23 @ 16:02), Max: 36.9 (02-02-23 @ 16:02)  WBC Count: 6.80 K/uL (02-02-23 @ 11:20)    No current indication for abx. Will monitor leukocytosis with routine CBCs      ===================HEMATOLOGIC/ONC ===================  Hemoglobin: 15.1 g/dL (02-02-23 @ 11:20)    Platelet Count - Automated: 238 K/uL (02-02-23 @ 11:20)    Holding heparin in light of oozing from R Radial site    aspirin enteric coated 81 milliGRAM(s) Oral daily for maintenance    =======================    ENDOCRINE  =====================  No indication for insulin at this time however glucose was 145 in ER will continue to monitor with routine labs, now normal. Pending HA1C    atorvastatin 40 milliGRAM(s) Oral at bedtime    ======================= LINES/TUBES  =====================    Pt currently without lines. Access for cath obtained through R radial artery, still oozing will monitor with pressure dressing    ======================= DISPOSITION  =====================  Pt is stable at this time with anticipated d/c to home after TTE if appropriate    Patient requires continuous monitoring with bedside rhythm monitoring, pulse ox monitoring, and intermittent blood gas analysis. Care plan discussed with ICU care team. Patient remained critical and at risk for life threatening decompensation.  Patient seen, examined and plan discussed with CCU team during rounds.  42yo F with PMHx of HTN, GERD with belcher's esophagus BIBEMS to [facility] presenting with chest pain x10 days presenting with NSTEMI s/p cardiac cath with Dr. Joya. mRCA found to be 99% occluded s/p MARIE.    ====================== NEUROLOGY=====================  A&Ox4  Nicotine patch for smoking withdrawal  pt may ambulate      ====================CARDIOVASCULAR==================  NSTEMI s/p RCA MARIE  - Continue ASA, Brilinta, Lipitor   BP,  140s/70s resumed on metoprolol succinate 50 qd, losartan 25mg qd  HR 60s- 70s, continue to monitor  - CE peaked and now downtrending  - TTE this AM      ==================== RESPIRATORY======================  Currently saturating 100% on RA, keep >90      ===================== RENAL =========================    02-02-23 @ 07:01  -  02-02-23 @ 17:07  --------------------------------------------------------  IN: 150 mL / OUT: 0 mL / NET: 150 mL      BUN/Cr: 10/0.75, normal, will monitor with routine labs      ==================== GASTROINTESTINAL===================    Diet: REG, DASH/TLC    Initiate home PPI 40mg Protonix qd    ========================INFECTIOUS DISEASE================  T(C): 36.9 (02-02-23 @ 16:02), Max: 36.9 (02-02-23 @ 16:02)  WBC Count: 6.80 K/uL (02-02-23 @ 11:20)    No current indication for abx. Will monitor leukocytosis with routine CBCs      ===================HEMATOLOGIC/ONC ===================  Hemoglobin: 15.1 g/dL (02-02-23 @ 11:20)    Platelet Count - Automated: 238 K/uL (02-02-23 @ 11:20)    Holding heparin in light of oozing from R Radial site    aspirin enteric coated 81 milliGRAM(s) Oral daily for maintenance    =======================    ENDOCRINE  =====================  No indication for insulin at this time however glucose was 145 in ER will continue to monitor with routine labs, now normal. Pending HA1C    atorvastatin 40 milliGRAM(s) Oral at bedtime    ======================= LINES/TUBES  =====================    Pt currently without lines. Access for cath obtained through R radial artery, still oozing will monitor with pressure dressing    ======================= DISPOSITION  =====================  Pt is stable at this time with anticipated d/c to home after TTE if appropriate. Pt to follow-up with cardiology in 2 weeks.    Patient requires continuous monitoring with bedside rhythm monitoring, pulse ox monitoring.  Patient seen, examined and plan discussed with CCU team during rounds.

## 2023-02-03 NOTE — PROGRESS NOTE ADULT - SUBJECTIVE AND OBJECTIVE BOX
FLO SOLORIO  MRN-[id number]  Patient is a 43y old  Female who presents with a chief complaint of STEMI (2023 20:49)    HPI:  42yo F with PMHx of HTN, GERD with belcher's esophagus BIBEMS to [facility] presenting with chest pain x10 days. Pt states she initially thought pain was 2/2 reflux/GERD and tried taking PPIs, pepcid and Mylanta without relief of sx's, Pain was worse with increased activity. Pt states pain was more severe today described as constant, moderate, burning left-sided chest pain radiating to the jaw and down the left hand. Admits to some associated diaphoresis today and shortness of breath. Denies prior cardiac work-up. Denies any fevers, chills, nausea/vomiting/diarrhea, lightheadedness, dizziness or changes in vision.    Pt was found to have NSTEMI in [facility] ER with normal cardiac markers and was sent to [facility] for cardiac cathter intervention.    (2023 16:35)      24 HOUR EVENTS:    REVIEW OF SYSTEMS:    CONSTITUTIONAL: No weakness, fevers or chills  EYES/ENT: No visual changes;  No vertigo or throat pain   NECK: No pain or stiffness  RESPIRATORY: No cough, wheezing, hemoptysis; No shortness of breath  CARDIOVASCULAR: No chest pain or palpitations  GASTROINTESTINAL: No abdominal or epigastric pain. No nausea, vomiting, or hematemesis; No diarrhea or constipation. No melena or hematochezia.  GENITOURINARY: No dysuria, frequency or hematuria  NEUROLOGICAL: No numbness or weakness  SKIN: No itching, rashes      ICU Vital Signs Last 24 Hrs  T(C): 37 (2023 06:00), Max: 37.1 (2023 19:30)  T(F): 98.6 (2023 06:00), Max: 98.8 (2023 19:30)  HR: 68 (2023 06:00) (54 - 96)  BP: 146/76 (2023 06:00) (125/78 - 185/95)  BP(mean): 105 (2023 06:00) (97 - 122)  ABP: --  ABP(mean): --  RR: 23 (2023 06:00) (14 - 30)  SpO2: 100% (2023 06:00) (94% - 100%)    O2 Parameters below as of 2023 06:00  Patient On (Oxygen Delivery Method): room air    I&O's Summary    2023 07:01  -  2023 06:56  --------------------------------------------------------  IN: 440 mL / OUT: 500 mL / NET: -60 mL        CAPILLARY BLOOD GLUCOSE    CAPILLARY BLOOD GLUCOSE    PHYSICAL EXAM:  GENERAL: No acute distress, well-developed  HEAD:  Atraumatic, Normocephalic  EYES: EOMI, PERRLA, conjunctiva and sclera clear  NECK: Supple, no lymphadenopathy, no JVD  CHEST/LUNG: CTAB; No wheezes, rales, or rhonchi  HEART: Regular rate and rhythm. Normal S1/S2. No murmurs, rubs, or gallops  ABDOMEN: Soft, non-tender, non-distended; normal bowel sounds, no organomegaly  EXTREMITIES:  2+ peripheral pulses b/l, No clubbing, cyanosis, or edema  NEUROLOGY: A&O x 3, no focal deficits  SKIN: No rashes or lesions    ============================I/O===========================   I&O's Detail    2023 07:01  -  2023 06:56  --------------------------------------------------------  IN:    IV PiggyBack: 50 mL    Oral Fluid: 240 mL    sodium chloride 0.9%: 150 mL  Total IN: 440 mL    OUT:    Voided (mL): 500 mL  Total OUT: 500 mL    Total NET: -60 mL        ============================ LABS =========================                        13.6   8.37  )-----------( 193      ( 2023 03:57 )             40.9         138  |  105  |  10  ----------------------------<  88  4.0   |  23  |  0.75    Ca    9.4      2023 03:57  Phos  3.7       Mg     2.1         TPro  7.1  /  Alb  4.1  /  TBili  0.4  /  DBili  x   /  AST  26  /  ALT  10  /  AlkPhos  51      Troponin T, High Sensitivity Result: 85 ng/L (23 @ 03:57)  Troponin T, High Sensitivity Result: 95 ng/L (23 @ 20:10)    CKMB Units: 5.1 ng/mL (23 @ 03:57)  CKMB Units: 5.4 ng/mL (23 @ 20:10)  CKMB Units: <1.0 ng/mL (23 @ 11:20)    Creatine Kinase, Serum: 92 U/L (23 @ 03:57)  Creatine Kinase, Serum: 88 U/L (23 @ 20:10)  Creatine Kinase, Serum: 69 U/L (23 @ 11:20)    CPK Mass Assay %: <1.4 % (23 @ 11:20)        LIVER FUNCTIONS - ( 2023 03:57 )  Alb: 4.1 g/dL / Pro: 7.1 g/dL / ALK PHOS: 51 U/L / ALT: 10 U/L / AST: 26 U/L / GGT: x           PT/INR - ( 2023 03:57 )   PT: 13.1 sec;   INR: 1.14 ratio         PTT - ( 2023 03:57 )  PTT:27.5 sec        ======================Micro/Rad/Cardio=================  Telemetry: Reviewed   EKG: Reviewed  CXR: Reviewed  Culture: Reviewed   Echo:   Cath:   ======================================================  PAST MEDICAL & SURGICAL HISTORY:  No pertinent past medical history      HTN (hypertension)      GERD (gastroesophageal reflux disease)      History of Belcher&#x27;s esophagus      H/O:  FLO SOLORIO  MRN-[id number]  Patient is a 43y old  Female who presents with a chief complaint of STEMI (2023 20:49)    HPI:  44yo F with PMHx of HTN, GERD with belcher's esophagus BIBEMS to [facility] presenting with chest pain x10 days. Pt states she initially thought pain was 2/2 reflux/GERD and tried taking PPIs, pepcid and Mylanta without relief of sx's, Pain was worse with increased activity. Pt states pain was more severe today described as constant, moderate, burning left-sided chest pain radiating to the jaw and down the left hand. Admits to some associated diaphoresis today and shortness of breath. Denies prior cardiac work-up. Denies any fevers, chills, nausea/vomiting/diarrhea, lightheadedness, dizziness or changes in vision.    Pt was found to have NSTEMI in [facility] ER with normal cardiac markers and was sent to [facility] for cardiac cathter intervention.    (2023 16:35)      24 HOUR EVENTS:  Pt without events. some mild oozing from R radial site.    REVIEW OF SYSTEMS:    CONSTITUTIONAL: No weakness, fevers or chills  EYES/ENT: No visual changes;  No vertigo or throat pain   NECK: No pain or stiffness  RESPIRATORY: No cough, wheezing, hemoptysis; No shortness of breath  CARDIOVASCULAR: No chest pain or palpitations  GASTROINTESTINAL: No abdominal or epigastric pain. No nausea, vomiting, or hematemesis; No diarrhea or constipation. No melena or hematochezia.  GENITOURINARY: No dysuria, frequency or hematuria  NEUROLOGICAL: No numbness or weakness  SKIN: No itching, rashes      ICU Vital Signs Last 24 Hrs  T(C): 37 (2023 06:00), Max: 37.1 (2023 19:30)  T(F): 98.6 (2023 06:00), Max: 98.8 (2023 19:30)  HR: 68 (2023 06:00) (54 - 96)  BP: 146/76 (2023 06:00) (125/78 - 185/95)  BP(mean): 105 (2023 06:00) (97 - 122)  ABP: --  ABP(mean): --  RR: 23 (2023 06:00) (14 - 30)  SpO2: 100% (2023 06:00) (94% - 100%)    O2 Parameters below as of 2023 06:00  Patient On (Oxygen Delivery Method): room air    I&O's Summary    2023 07:01  -  2023 06:56  --------------------------------------------------------  IN: 440 mL / OUT: 500 mL / NET: -60 mL        CAPILLARY BLOOD GLUCOSE    CAPILLARY BLOOD GLUCOSE    PHYSICAL EXAM:  GENERAL: No acute distress, well-developed  HEAD:  Atraumatic, Normocephalic  EYES: EOMI, PERRLA, conjunctiva and sclera clear  NECK: Supple, no lymphadenopathy, no JVD  CHEST/LUNG: CTAB; No wheezes, rales, or rhonchi  HEART: Regular rate and rhythm. Normal S1/S2. No murmurs, rubs, or gallops  ABDOMEN: Soft, non-tender, non-distended; normal bowel sounds, no organomegaly  EXTREMITIES:  2+ peripheral pulses b/l, No clubbing, cyanosis, or edema  NEUROLOGY: A&O x 3, no focal deficits  SKIN: No rashes or lesions    ============================I/O===========================   I&O's Detail    2023 07:01  -  2023 06:56  --------------------------------------------------------  IN:    IV PiggyBack: 50 mL    Oral Fluid: 240 mL    sodium chloride 0.9%: 150 mL  Total IN: 440 mL    OUT:    Voided (mL): 500 mL  Total OUT: 500 mL    Total NET: -60 mL        ============================ LABS =========================                        13.6   8.37  )-----------( 193      ( 2023 03:57 )             40.9         138  |  105  |  10  ----------------------------<  88  4.0   |  23  |  0.75    Ca    9.4      2023 03:57  Phos  3.7       Mg     2.1         TPro  7.1  /  Alb  4.1  /  TBili  0.4  /  DBili  x   /  AST  26  /  ALT  10  /  AlkPhos  51      Troponin T, High Sensitivity Result: 85 ng/L (23 @ 03:57)  Troponin T, High Sensitivity Result: 95 ng/L (23 @ 20:10)    CKMB Units: 5.1 ng/mL (23 @ 03:57)  CKMB Units: 5.4 ng/mL (23 @ 20:10)  CKMB Units: <1.0 ng/mL (23 @ 11:20)    Creatine Kinase, Serum: 92 U/L (23 @ 03:57)  Creatine Kinase, Serum: 88 U/L (23 @ 20:10)  Creatine Kinase, Serum: 69 U/L (23 @ 11:20)    CPK Mass Assay %: <1.4 % (23 @ 11:20)        LIVER FUNCTIONS - ( 2023 03:57 )  Alb: 4.1 g/dL / Pro: 7.1 g/dL / ALK PHOS: 51 U/L / ALT: 10 U/L / AST: 26 U/L / GGT: x           PT/INR - ( 2023 03:57 )   PT: 13.1 sec;   INR: 1.14 ratio         PTT - ( 2023 03:57 )  PTT:27.5 sec        ======================Micro/Rad/Cardio=================  Telemetry: Reviewed   EKG: Reviewed  CXR: Reviewed  Culture: Reviewed   Echo:   Cath:   ======================================================  PAST MEDICAL & SURGICAL HISTORY:  No pertinent past medical history      HTN (hypertension)      GERD (gastroesophageal reflux disease)      History of Belcher&#x27;s esophagus      H/O:  FLO SOLORIO  MRN-[id number]  Patient is a 43y old  Female who presents with a chief complaint of STEMI (2023 20:49)    HPI:  42yo F with PMHx of HTN, GERD with belcher's esophagus BIBEMS to [facility] presenting with chest pain x10 days. Pt states she initially thought pain was 2/2 reflux/GERD and tried taking PPIs, pepcid and Mylanta without relief of sx's, Pain was worse with increased activity. Pt states pain was more severe today described as constant, moderate, burning left-sided chest pain radiating to the jaw and down the left hand. Admits to some associated diaphoresis today and shortness of breath. Denies prior cardiac work-up. Denies any fevers, chills, nausea/vomiting/diarrhea, lightheadedness, dizziness or changes in vision.    Pt was found to have NSTEMI in [facility] ER with normal cardiac markers and was sent to [facility] for cardiac cathter intervention.    (2023 16:35)      24 HOUR EVENTS:  Pt without events. some mild oozing from R radial site.    REVIEW OF SYSTEMS:    CONSTITUTIONAL: No weakness, fevers or chills  EYES/ENT: No visual changes;  No vertigo or throat pain   NECK: No pain or stiffness  RESPIRATORY: No cough, wheezing, hemoptysis; No shortness of breath  CARDIOVASCULAR: No chest pain or palpitations  GASTROINTESTINAL: No abdominal or epigastric pain. No nausea, vomiting, or hematemesis; No diarrhea or constipation. No melena or hematochezia.  GENITOURINARY: No dysuria, frequency or hematuria  NEUROLOGICAL: No numbness or weakness  SKIN: No itching, rashes      ICU Vital Signs Last 24 Hrs  T(C): 37 (2023 06:00), Max: 37.1 (2023 19:30)  T(F): 98.6 (2023 06:00), Max: 98.8 (2023 19:30)  HR: 68 (2023 06:00) (54 - 96)  BP: 146/76 (2023 06:00) (125/78 - 185/95)  BP(mean): 105 (2023 06:00) (97 - 122)  ABP: --  ABP(mean): --  RR: 23 (2023 06:00) (14 - 30)  SpO2: 100% (2023 06:00) (94% - 100%)    O2 Parameters below as of 2023 06:00  Patient On (Oxygen Delivery Method): room air    I&O's Summary    2023 07:01  -  2023 06:56  --------------------------------------------------------  IN: 440 mL / OUT: 500 mL / NET: -60 mL        CAPILLARY BLOOD GLUCOSE    CAPILLARY BLOOD GLUCOSE    PHYSICAL EXAM:  GENERAL: No acute distress, well-developed  HEAD:  Atraumatic, Normocephalic  EYES: EOMI, PERRLA, conjunctiva and sclera clear  NECK: Supple, no lymphadenopathy, no JVD  CHEST/LUNG: CTAB; No wheezes, rales, or rhonchi  HEART: Regular rate and rhythm. Normal S1/S2. No murmurs, rubs, or gallops  ABDOMEN: Soft, non-tender, non-distended; normal bowel sounds, no organomegaly  EXTREMITIES:  2+ peripheral pulses b/l, No clubbing, cyanosis, or edema  NEUROLOGY: A&O x 3, no focal deficits  SKIN: No rashes or lesions    ============================I/O===========================   I&O's Detail    2023 07:01  -  2023 06:56  --------------------------------------------------------  IN:    IV PiggyBack: 50 mL    Oral Fluid: 240 mL    sodium chloride 0.9%: 150 mL  Total IN: 440 mL    OUT:    Voided (mL): 500 mL  Total OUT: 500 mL    Total NET: -60 mL        ============================ LABS =========================                        13.6   8.37  )-----------( 193      ( 2023 03:57 )             40.9         138  |  105  |  10  ----------------------------<  88  4.0   |  23  |  0.75    Ca    9.4      2023 03:57  Phos  3.7       Mg     2.1         TPro  7.1  /  Alb  4.1  /  TBili  0.4  /  DBili  x   /  AST  26  /  ALT  10  /  AlkPhos  51      Troponin T, High Sensitivity Result: 85 ng/L (23 @ 03:57)  Troponin T, High Sensitivity Result: 95 ng/L (23 @ 20:10)    CKMB Units: 5.1 ng/mL (23 @ 03:57)  CKMB Units: 5.4 ng/mL (23 @ 20:10)  CKMB Units: <1.0 ng/mL (23 @ 11:20)    Creatine Kinase, Serum: 92 U/L (23 @ 03:57)  Creatine Kinase, Serum: 88 U/L (23 @ 20:10)  Creatine Kinase, Serum: 69 U/L (23 @ 11:20)    CPK Mass Assay %: <1.4 % (23 @ 11:20)        LIVER FUNCTIONS - ( 2023 03:57 )  Alb: 4.1 g/dL / Pro: 7.1 g/dL / ALK PHOS: 51 U/L / ALT: 10 U/L / AST: 26 U/L / GGT: x           PT/INR - ( 2023 03:57 )   PT: 13.1 sec;   INR: 1.14 ratio         PTT - ( 2023 03:57 )  PTT:27.5 sec        ======================Micro/Rad/Cardio=================  Telemetry: Reviewed   EKG: Reviewed  CXR: Reviewed    ======================================================  PAST MEDICAL & SURGICAL HISTORY:  No pertinent past medical history      HTN (hypertension)      GERD (gastroesophageal reflux disease)      History of Belcher&#x27;s esophagus      H/O:

## 2023-02-14 PROBLEM — K21.9 GASTRO-ESOPHAGEAL REFLUX DISEASE WITHOUT ESOPHAGITIS: Chronic | Status: ACTIVE | Noted: 2023-02-02

## 2023-02-14 PROBLEM — I10 ESSENTIAL (PRIMARY) HYPERTENSION: Chronic | Status: ACTIVE | Noted: 2023-02-02

## 2023-02-14 PROBLEM — Z87.19 PERSONAL HISTORY OF OTHER DISEASES OF THE DIGESTIVE SYSTEM: Chronic | Status: ACTIVE | Noted: 2023-02-02

## 2023-03-06 RX ORDER — FAMOTIDINE 20 MG/1
20 TABLET, FILM COATED ORAL
Refills: 0 | Status: ACTIVE | COMMUNITY

## 2023-03-06 RX ORDER — OMEPRAZOLE 40 MG/1
40 CAPSULE, DELAYED RELEASE ORAL
Qty: 90 | Refills: 3 | Status: ACTIVE | COMMUNITY

## 2023-03-07 ENCOUNTER — APPOINTMENT (OUTPATIENT)
Dept: CARDIOLOGY | Facility: CLINIC | Age: 43
End: 2023-03-07
Payer: COMMERCIAL

## 2023-03-07 VITALS
HEIGHT: 65 IN | HEART RATE: 70 BPM | SYSTOLIC BLOOD PRESSURE: 112 MMHG | BODY MASS INDEX: 27.16 KG/M2 | OXYGEN SATURATION: 100 % | WEIGHT: 163 LBS | DIASTOLIC BLOOD PRESSURE: 75 MMHG

## 2023-03-07 DIAGNOSIS — I21.4 NON-ST ELEVATION (NSTEMI) MYOCARDIAL INFARCTION: ICD-10-CM

## 2023-03-07 PROCEDURE — 99214 OFFICE O/P EST MOD 30 MIN: CPT | Mod: 25

## 2023-03-07 PROCEDURE — 93000 ELECTROCARDIOGRAM COMPLETE: CPT

## 2023-03-07 RX ORDER — ASPIRIN ENTERIC COATED TABLETS 81 MG 81 MG/1
81 TABLET, DELAYED RELEASE ORAL
Qty: 90 | Refills: 1 | Status: ACTIVE | COMMUNITY
Start: 1900-01-01 | End: 1900-01-01

## 2023-03-09 ENCOUNTER — NON-APPOINTMENT (OUTPATIENT)
Age: 43
End: 2023-03-09

## 2023-03-09 PROBLEM — I21.4 NSTEMI, INITIAL EPISODE OF CARE: Status: ACTIVE | Noted: 2023-03-06

## 2023-03-09 NOTE — REASON FOR VISIT
[CV Risk Factors and Non-Cardiac Disease] : CV risk factors and non-cardiac disease [Coronary Artery Disease] : coronary artery disease [Other: ____] : [unfilled]

## 2023-03-09 NOTE — DISCUSSION/SUMMARY
[FreeTextEntry1] : 44 yo woman with recent NSTEMI and PCI to the RCA 2/2/23, HTN, Last's esophagitis, current smoker, who presents for cardiac evaluation after hospitalization. \par \par NSTEMI MARIE RCA 2/2/23 with preserved EF\par -doing well from cardiac stand point \par -cont DAPT 12 months \par -cont Metoprolol 100 mg daily and Losartan 25 mg daily\par -cardiac rehab referral provided \par \par HTN controlled\par -cont Losartan and BB \par \par HLD  2/2022\par -cont Lipitor 80 mg po daily, will check LDL at some point for efficacy \par \par Active smoker \par -long discussion with her about that.  I strongly suggested to quit, smoking cessation support at [facility] recommended.  She previously was supposed to try Chantix but decided not to and gave it to her cousin who successfully quit, she wanted to try something pharmacologic because she said she will be able to quit without it.   After a long discussion we agreed on Wellbutrin.  I discussed with her SE and black box warning of increased suicidality and pt verbalized understanding and wished to try the medicine.  Pt will need eventual abd sono to rule out AAA.\par \par Follow up with me in one month.  I filled out her paperwork for work today.  We discussed that there are no issues and limitations from cardiac stand point to return to work.  \par \par  [EKG obtained to assist in diagnosis and management of assessed problem(s)] : EKG obtained to assist in diagnosis and management of assessed problem(s)

## 2023-03-09 NOTE — REVIEW OF SYSTEMS
[Fever] : no fever [Weight Gain (___ Lbs)] : no recent weight gain [Chills] : no chills [Weight Loss (___ Lbs)] : no recent weight loss [Blurry Vision] : no blurred vision [Seeing Double (Diplopia)] : no diplopia [Wheezing] : no wheezing [Coughing Up Blood] : no hemoptysis [Abdominal Pain] : no abdominal pain [Nausea] : no nausea [Vomiting] : no vomiting [Diarrhea] : diarrhea [Dysuria] : no dysuria [Dizziness] : no dizziness [Tremor] : no tremor was seen [Numbness (Hypoesthesia)] : no numbness [Speech Disturbance] : no speech disturbance [Suicidal] : not suicidal [Negative] : Heme/Lymph

## 2023-03-09 NOTE — PHYSICAL EXAM
[Normal S1, S2] : normal S1, S2 [No Rub] : no rub [No Gallop] : no gallop [Soft] : abdomen soft [Non Tender] : non-tender [Normal] : alert and oriented, normal memory

## 2023-03-09 NOTE — HISTORY OF PRESENT ILLNESS
[FreeTextEntry1] : 44 yo woman with recent NSTEMI and PCI to the RCA 2/2/23, HTN, Last's esophagitis, current smoker, who presents for cardiac evaluation after hospitalization.  Pt reports compliance with meds.  She denies any CP, SOB, PHAN, palpitations, LE edema, PND, syncope.  She is still smoking both cigarettes and marijuana and is having a hard time quitting.  Pt has not been working since the discharge and brought paperwork from work to be filled out about return to work.  \par \par Soc Hx: moderate ETOH use, + marijuana use daily and cigarette smoker, works in environmental in [employer] \par Fam Hx: mother alive 61 yo with brain aneurysm, father alive 61 yo healthy

## 2023-03-21 ENCOUNTER — APPOINTMENT (OUTPATIENT)
Dept: CARDIOLOGY | Facility: CLINIC | Age: 43
End: 2023-03-21

## 2023-03-21 NOTE — HISTORY OF PRESENT ILLNESS
[Low sodium diet] : low sodium diet [Other diet strategies] : other diet strategies [Patient will have a blood pressure < 130/80 mmHg] : patient will have a blood pressure < 130/80 mmHg [Role of lifestyle behaviors in blood pressure management] : role of lifestyle behaviors in blood pressure management [Factors affecting blood pressure] : factors affecting blood pressure [Sodium] : sodium [Yes, continue with Hypertension plan] : Yes, continue with Hypertension plan [< 12 months] : < 12 months [Patient will practice coping strategies as per recommendations and guidelines] : Patient will practice coping strategies as per recommendations and guidelines [Discussed overview of risks of continued use] : overview of risks of continued use [Provide patient with contact information and brochure for [facility] ([facility])] : provide patient with contact information and brochure for [facility] ([facility]) [Yes, continue with Smoking/Tobacco Cession plan] : Yes, continue with Smoking/Tobacco Cession plan [None] : none [PCI/Stent] : PCI/stent [HLD] : HLD [Sedentary] : sedentary [Smoking] : smoking [Cardiac Rehabilitation] : Cardiac Rehabilitation [>= 31 minutes per session] : >= 31 minutes per session [Target RPE: ___] : Target RPE: [unfilled] [Treadmill] : treadmill [Recumbent bike] : recumbent bike [Upright exercise bike] : upright exercise bike [BioStep] : BioStep [Elliptical] : elliptical [Upper body ergometer] : upper body ergometer [Stair Climber] : stair climber [Walking] : walking [Assess in ___ weeks] : assess in [unfilled] weeks [Stretching/ROM exercises and balance exercises daily] : Stretching/ROM exercises and balance exercises daily [Will assess for musculoskeletal and other restrictions] : will assess for musculoskeletal and other restrictions [To start resistance training] : to start resistance training [To increase my time spent in physical activity and/or aerobic exercise] : to increase my time spent in physical activity and/or aerobic exercise [Individualized Exercise Rx] : individualized exercise Rx [Welcome packet provided] : welcome packet provided [Yes, per exercise prescription, policy] : Yes, per exercise prescription, policy [Action] : Stage of change: action [Depression] : depression [Anxiety] : anxiety [Self-reports of improved psychosocial well-being] : Self-reports of improved psychosocial well-being [Relaxation breathing techniques] : relaxation breathing techniques [[facility]] : [facility] [Yes, continue with psychosocial plan] : Yes, continue with psychosocial plan [Hypertension] : Hypertension [Is patient on medication for hyperlipidemia?] : Is patient on medication for hyperlipidemia? Yes [Atorvastatin] : atorvastatin [Self] : self [Self-reports of improved health eating patterns] : Self-reports of improved health eating patterns [MyPlate[employer]gov] : MyPlate[employer]gov [Teach back utilized] : teach back utilized [Yes, continue with nutrition plan] : Yes, continue with nutrition plan [In progress] : in progress [FreeTextEntry5] : Dr. 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 No [FreeTextEntry9] : Improved PHQ9 [FreeTextEntry6] : P [FreeTextEntry8] : Intake: patient states she is eating heart healthfully, has eliminated red meat, eating chicken, turkey, salmon; not eating out; she has a healthy smoothie sometimes, includes whole grains, fruits and vegetables; she is starting to read food labels; she declined RD contact information; height 5'5", weight 163lbs. [FreeTextEntry1] : Patient presents to day for phone intake for cardiac rehab, referred by Dr. Joya; diagnosis NSTEMI, s/p RCA stent on 2/2/23. Medical history includes HTN, Last's esophagus, and cigarette smoking.  She states she is eating heart healthfully, states she has not had a cigarette since her February stent, has eliminated drinking alcohol; she is walking 5 blocks regularly; she endorses fear and anxiety - does not participate in behavioral health counseling; she denies chest pain;  she endorses: shortness of breath with stairs, occasional lightheadedness with abrupt change in position, "acid" pain in abdomen associated with belching; occasional heart racing with strenuous activity.

## 2023-03-21 NOTE — PLAN
[FreeTextEntry1] : Cardiac Rehabilitation Phase 2\par Focus assessment and physical exam at session #1\par ITP initiated- confer with Dr. Rico\par \par All questions answered. \par Welcome packet mailed.\par Start date: 4/3/23\par

## 2023-03-21 NOTE — HISTORY OF PRESENT ILLNESS
[Low sodium diet] : low sodium diet [Other diet strategies] : other diet strategies [Patient will have a blood pressure < 130/80 mmHg] : patient will have a blood pressure < 130/80 mmHg [Role of lifestyle behaviors in blood pressure management] : role of lifestyle behaviors in blood pressure management [Factors affecting blood pressure] : factors affecting blood pressure [Sodium] : sodium [Yes, continue with Hypertension plan] : Yes, continue with Hypertension plan [< 12 months] : < 12 months [Patient will practice coping strategies as per recommendations and guidelines] : Patient will practice coping strategies as per recommendations and guidelines [Discussed overview of risks of continued use] : overview of risks of continued use [Provide patient with contact information and brochure for [facility] ([facility])] : provide patient with contact information and brochure for [facility] ([facility]) [Yes, continue with Smoking/Tobacco Cession plan] : Yes, continue with Smoking/Tobacco Cession plan [None] : none [PCI/Stent] : PCI/stent [HLD] : HLD [Sedentary] : sedentary [Smoking] : smoking [Cardiac Rehabilitation] : Cardiac Rehabilitation [>= 31 minutes per session] : >= 31 minutes per session [Target RPE: ___] : Target RPE: [unfilled] [Treadmill] : treadmill [Recumbent bike] : recumbent bike [Upright exercise bike] : upright exercise bike [BioStep] : BioStep [Elliptical] : elliptical [Upper body ergometer] : upper body ergometer [Stair Climber] : stair climber [Walking] : walking [Assess in ___ weeks] : assess in [unfilled] weeks [Stretching/ROM exercises and balance exercises daily] : Stretching/ROM exercises and balance exercises daily [Will assess for musculoskeletal and other restrictions] : will assess for musculoskeletal and other restrictions [To start resistance training] : to start resistance training [To increase my time spent in physical activity and/or aerobic exercise] : to increase my time spent in physical activity and/or aerobic exercise [Individualized Exercise Rx] : individualized exercise Rx [Welcome packet provided] : welcome packet provided [Yes, per exercise prescription, policy] : Yes, per exercise prescription, policy [Action] : Stage of change: action [Depression] : depression [Anxiety] : anxiety [Self-reports of improved psychosocial well-being] : Self-reports of improved psychosocial well-being [Relaxation breathing techniques] : relaxation breathing techniques [[facility]] : [facility] [Yes, continue with psychosocial plan] : Yes, continue with psychosocial plan [Hypertension] : Hypertension [Is patient on medication for hyperlipidemia?] : Is patient on medication for hyperlipidemia? Yes [Atorvastatin] : atorvastatin [Self] : self [Self-reports of improved health eating patterns] : Self-reports of improved health eating patterns [MyPlate.gov] : MyPlate.gov [Teach back utilized] : teach back utilized [Yes, continue with nutrition plan] : Yes, continue with nutrition plan [In progress] : in progress [FreeTextEntry5] : Dr. 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 No [FreeTextEntry9] : Improved PHQ9 [FreeTextEntry6] : P [FreeTextEntry8] : Intake: patient states she is eating heart healthfully, has eliminated red meat, eating chicken, turkey, salmon; not eating out; she has a healthy smoothie sometimes, includes whole grains, fruits and vegetables; she is starting to read food labels; she declined RD contact information; height 5'5", weight 163lbs. [FreeTextEntry1] : Patient presents to day for phone intake for cardiac rehab, referred by Dr. Joya; diagnosis NSTEMI, s/p RCA stent on 2/2/23. Medical history includes HTN, Last's esophagus, and cigarette smoking.  She states she is eating heart healthfully, states she has not had a cigarette since her February stent, has eliminated drinking alcohol; she is walking 5 blocks regularly; she endorses fear and anxiety - does not participate in behavioral health counseling; she denies chest pain;  she endorses: shortness of breath with stairs, occasional lightheadedness with abrupt change in position, "acid" pain in abdomen associated with belching; occasional heart racing with strenuous activity.

## 2023-03-21 NOTE — REASON FOR VISIT
[Assessment] : an assessment [FreeTextEntry1] : intake; ITP to be reviewed and manually signed by Dr. Hale; ITP to be finalized at session #1; Clinical indication for cardiac rehabilitation: PCI/Stent [Intake Visit] : an intake visit [Home] : at home, [unfilled] , at the time of the visit. [Medical Office: ([facility])___] : at the medical office located in  [Verbal consent obtained from patient] : the patient, [unfilled]

## 2023-03-21 NOTE — REVIEW OF SYSTEMS
[Palpitations] : palpitations [Shortness Of Breath] : shortness of breath [Headache] : headache [Dizziness] : dizziness [Negative] : Musculoskeletal [FreeTextEntry5] : with strenuous activity [FreeTextEntry6] : with stairs; +snores [FreeTextEntry7] : abdominal distension; occasional belching and "acid" discomfort [de-identified] : daily- takes ibuprofen; dizziness with abrupt change in position

## 2023-03-21 NOTE — REVIEW OF SYSTEMS
[Palpitations] : palpitations [Shortness Of Breath] : shortness of breath [Headache] : headache [Dizziness] : dizziness [Negative] : Musculoskeletal [FreeTextEntry5] : with strenuous activity [FreeTextEntry6] : with stairs; +snores [FreeTextEntry7] : abdominal distension; occasional belching and "acid" discomfort [de-identified] : daily- takes ibuprofen; dizziness with abrupt change in position

## 2023-03-21 NOTE — REASON FOR VISIT
[Assessment] : an assessment [FreeTextEntry1] : intake; ITP to be reviewed and manually signed by Dr. Hale; ITP to be finalized at session #1; Clinical indication for cardiac rehabilitation: PCI/Stent [Intake Visit] : an intake visit [Home] : at home, [unfilled] , at the time of the visit. [Medical Office: ([facility])___] : at the medical office located in  [Verbal consent obtained from patient] : the patient, [unfilled]

## 2023-03-22 ENCOUNTER — TRANSCRIPTION ENCOUNTER (OUTPATIENT)
Age: 43
End: 2023-03-22

## 2023-03-22 ENCOUNTER — APPOINTMENT (OUTPATIENT)
Dept: CARDIOLOGY | Facility: CLINIC | Age: 43
End: 2023-03-22

## 2023-03-22 ENCOUNTER — NON-APPOINTMENT (OUTPATIENT)
Age: 43
End: 2023-03-22

## 2023-03-30 ENCOUNTER — APPOINTMENT (OUTPATIENT)
Dept: CARDIOLOGY | Facility: CLINIC | Age: 43
End: 2023-03-30
Payer: COMMERCIAL

## 2023-03-30 ENCOUNTER — NON-APPOINTMENT (OUTPATIENT)
Age: 43
End: 2023-03-30

## 2023-03-30 ENCOUNTER — APPOINTMENT (OUTPATIENT)
Dept: CARDIOLOGY | Facility: CLINIC | Age: 43
End: 2023-03-30

## 2023-03-30 PROCEDURE — 93798 PHYS/QHP OP CAR RHAB W/ECG: CPT

## 2023-04-02 NOTE — PLAN
[FreeTextEntry1] : Cardiac Rehabilitation Phase 2\par \par ITP initiated- confer with Dr. Rico\par \par See session report for details.\par

## 2023-04-02 NOTE — HISTORY OF PRESENT ILLNESS
[FreeTextEntry1] : 3/21/23: Patient presents to day for phone intake for cardiac rehab, referred by Dr. Joya; diagnosis NSTEMI, s/p RCA stent on 2/2/23. Medical history includes HTN, Last's esophagus, and cigarette smoking.  She states she is eating heart healthfully, states she has not had a cigarette since her February stent, has eliminated drinking alcohol; she is walking 5 blocks regularly; she endorses fear and anxiety - does not participate in behavioral health counseling; she denies chest pain;  she endorses: shortness of breath with stairs, occasional lightheadedness with abrupt change in position, "acid" pain in abdomen associated with belching; occasional heart racing with strenuous activity. \par \par 3/30/23: Patient presents for session #1, states she is feeling well, reports feelings of her heart racing yesterday.

## 2023-04-02 NOTE — REVIEW OF SYSTEMS
[Palpitations] : palpitations [Shortness Of Breath] : shortness of breath [Headache] : headache [Dizziness] : dizziness [Anxiety] : anxiety [Depression] : depression [Negative] : Constitutional [FreeTextEntry5] : with strenuous activity [FreeTextEntry6] : with stairs; +snores [FreeTextEntry7] : abdominal distension; occasional belching and "acid" discomfort [de-identified] : daily- takes ibuprofen; dizziness with abrupt change in position [de-identified] : offered Behavioral Health Outpatient contact list for [facility]- patient received in mail, has not yet opened the mail to follow-up

## 2023-04-02 NOTE — PHYSICAL EXAM
[Well-Appearing] : well-appearing [No Acute Distress] : no acute distress [No JVD] : no jugular venous distention [Supple] : supple [No Respiratory Distress] : no respiratory distress  [No Accessory Muscle Use] : no accessory muscle use [Clear to Auscultation] : lungs were clear to auscultation bilaterally [Normal Rate] : normal rate  [Regular Rhythm] : with a regular rhythm [Normal S1, S2] : normal S1 and S2 [No Carotid Bruits] : no carotid bruits [No Edema] : there was no peripheral edema [Non Tender] : non-tender [No CVA Tenderness] : no CVA  tenderness [No Spinal Tenderness] : no spinal tenderness [Grossly Normal Strength/Tone] : grossly normal strength/tone [Coordination Grossly Intact] : coordination grossly intact [No Focal Deficits] : no focal deficits [Normal Gait] : normal gait [Normal Affect] : the affect was normal [Normal Insight/Judgement] : insight and judgment were intact

## 2023-04-04 ENCOUNTER — APPOINTMENT (OUTPATIENT)
Dept: CARDIOLOGY | Facility: CLINIC | Age: 43
End: 2023-04-04

## 2023-04-05 ENCOUNTER — APPOINTMENT (OUTPATIENT)
Dept: CARDIOLOGY | Facility: CLINIC | Age: 43
End: 2023-04-05
Payer: COMMERCIAL

## 2023-04-05 ENCOUNTER — APPOINTMENT (OUTPATIENT)
Dept: CARDIOLOGY | Facility: CLINIC | Age: 43
End: 2023-04-05

## 2023-04-05 PROCEDURE — 93798 PHYS/QHP OP CAR RHAB W/ECG: CPT

## 2023-04-06 ENCOUNTER — APPOINTMENT (OUTPATIENT)
Dept: CARDIOLOGY | Facility: CLINIC | Age: 43
End: 2023-04-06

## 2023-04-10 ENCOUNTER — APPOINTMENT (OUTPATIENT)
Dept: CARDIOLOGY | Facility: CLINIC | Age: 43
End: 2023-04-10

## 2023-04-10 ENCOUNTER — APPOINTMENT (OUTPATIENT)
Dept: CARDIOLOGY | Facility: CLINIC | Age: 43
End: 2023-04-10
Payer: COMMERCIAL

## 2023-04-10 PROCEDURE — 93797 PHYS/QHP OP CAR RHAB WO ECG: CPT

## 2023-04-10 NOTE — REASON FOR VISIT
[Assessment] : an assessment [FreeTextEntry1] :  ITP to be reviewed and manually signed by Dr. Hale; Clinical indication for cardiac rehabilitation: PCI/Stent

## 2023-04-10 NOTE — HISTORY OF PRESENT ILLNESS
[Low sodium diet] : low sodium diet [Other diet strategies] : other diet strategies [Patient will have a blood pressure < 130/80 mmHg] : patient will have a blood pressure < 130/80 mmHg [Role of lifestyle behaviors in blood pressure management] : role of lifestyle behaviors in blood pressure management [Factors affecting blood pressure] : factors affecting blood pressure [Sodium] : sodium [Yes, continue with Hypertension plan] : Yes, continue with Hypertension plan [< 12 months] : < 12 months [Patient will practice coping strategies as per recommendations and guidelines] : Patient will practice coping strategies as per recommendations and guidelines [Discussed overview of risks of continued use] : overview of risks of continued use [Provide patient with contact information and brochure for [facility] ([facility])] : provide patient with contact information and brochure for [facility]) [Yes, continue with Smoking/Tobacco Cession plan] : Yes, continue with Smoking/Tobacco Cession plan [None] : none [PCI/Stent] : PCI/stent [HLD] : HLD [Sedentary] : sedentary [Smoking] : smoking [Cardiac Rehabilitation] : Cardiac Rehabilitation [>= 31 minutes per session] : >= 31 minutes per session [Target RPE: ___] : Target RPE: [unfilled] [Treadmill] : treadmill [Recumbent bike] : recumbent bike [Upright exercise bike] : upright exercise bike [BioStep] : BioStep [Elliptical] : elliptical [Upper body ergometer] : upper body ergometer [Stair Climber] : stair climber [Walking] : walking [Assess in ___ weeks] : assess in [unfilled] weeks [Stretching/ROM exercises and balance exercises daily] : Stretching/ROM exercises and balance exercises daily [Will assess for musculoskeletal and other restrictions] : will assess for musculoskeletal and other restrictions [To start resistance training] : to start resistance training [To increase my time spent in physical activity and/or aerobic exercise] : to increase my time spent in physical activity and/or aerobic exercise [Individualized Exercise Rx] : individualized exercise Rx [Yes, per exercise prescription, policy] : Yes, per exercise prescription, policy [Depression] : depression [Anxiety] : anxiety [Self-reports of improved psychosocial well-being] : Self-reports of improved psychosocial well-being [Relaxation breathing techniques] : relaxation breathing techniques [[facility]] : [facility] [Yes, continue with psychosocial plan] : Yes, continue with psychosocial plan [Is patient on medication for hyperlipidemia?] : Is patient on medication for hyperlipidemia? Yes [Hypertension] : Hypertension [Atorvastatin] : atorvastatin [Self] : self [Self-reports of improved health eating patterns] : Self-reports of improved health eating patterns [MyPlate.gov] : MyPlate.gov [Teach back utilized] : teach back utilized [Yes, continue with nutrition plan] : Yes, continue with nutrition plan [In progress] : in progress [BP: ____ mmHg] : BP: [unfilled] mmHg [HR: ____ bpm] : BP: [unfilled] bpm [O2sat: ____ %] : O2sat: [unfilled] % [RPE: ____] : RPE: [unfilled]  [METS: ____] : METS: [unfilled]  [___ Days per week] : [unfilled] days per week [Equipment Orientation/Safety] : equipment orientation/safety [Exercise Benefits/Guidelines education] : exercise benefits/guidelines education [Patient verbalizes understanding of exercise education all questions answered] : Patient verbalizes understanding of exercise education all questions answered [Return demonstration] : return demonstration [Discuss overview of emotional health supportive modalities] : Discuss overview of emotional health supportive modalities [Action] : Stage of change: Action [Discuss an overview of healthy eating plan] : Discuss an overview of healthy eating plan [FreeTextEntry1] : Dr. Joya [FreeTextEntry5] : Dr. 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 No [FreeTextEntry6] : fear, states she worked through COVID; lives with her 16 y.o. daughter [FreeTextEntry9] : Improved PHQ9 is a goal\par 3/30/23: Patient did receive welcome packet, states she did not yet get to the forms to complete [FreeTextEntry8] : Intake: patient states she is eating heart healthfully, has eliminated red meat, eating chicken, turkey, salmon; not eating out; she has a healthy smoothie sometimes, includes whole grains, fruits and vegetables; she is starting to read food labels; she declined RD contact information; height 5'5", weight 163lbs.\par 3/30/23: Rate Your Plate: pending

## 2023-04-12 ENCOUNTER — APPOINTMENT (OUTPATIENT)
Dept: CARDIOLOGY | Facility: CLINIC | Age: 43
End: 2023-04-12
Payer: COMMERCIAL

## 2023-04-12 ENCOUNTER — APPOINTMENT (OUTPATIENT)
Dept: CARDIOLOGY | Facility: CLINIC | Age: 43
End: 2023-04-12

## 2023-04-12 PROCEDURE — 93797 PHYS/QHP OP CAR RHAB WO ECG: CPT | Mod: 1L

## 2023-04-13 ENCOUNTER — APPOINTMENT (OUTPATIENT)
Dept: CARDIOLOGY | Facility: CLINIC | Age: 43
End: 2023-04-13

## 2023-04-17 ENCOUNTER — APPOINTMENT (OUTPATIENT)
Dept: CARDIOLOGY | Facility: CLINIC | Age: 43
End: 2023-04-17

## 2023-04-19 ENCOUNTER — APPOINTMENT (OUTPATIENT)
Dept: CARDIOLOGY | Facility: CLINIC | Age: 43
End: 2023-04-19

## 2023-04-20 ENCOUNTER — APPOINTMENT (OUTPATIENT)
Dept: CARDIOLOGY | Facility: CLINIC | Age: 43
End: 2023-04-20

## 2023-04-24 ENCOUNTER — APPOINTMENT (OUTPATIENT)
Dept: CARDIOLOGY | Facility: CLINIC | Age: 43
End: 2023-04-24

## 2023-04-26 ENCOUNTER — APPOINTMENT (OUTPATIENT)
Dept: CARDIOLOGY | Facility: CLINIC | Age: 43
End: 2023-04-26

## 2023-04-26 ENCOUNTER — APPOINTMENT (OUTPATIENT)
Dept: CARDIOLOGY | Facility: CLINIC | Age: 43
End: 2023-04-26
Payer: COMMERCIAL

## 2023-04-26 PROCEDURE — 93797 PHYS/QHP OP CAR RHAB WO ECG: CPT

## 2023-04-27 ENCOUNTER — APPOINTMENT (OUTPATIENT)
Dept: CARDIOLOGY | Facility: CLINIC | Age: 43
End: 2023-04-27

## 2023-04-29 ENCOUNTER — NON-APPOINTMENT (OUTPATIENT)
Age: 43
End: 2023-04-29

## 2023-05-01 ENCOUNTER — APPOINTMENT (OUTPATIENT)
Dept: CARDIOLOGY | Facility: CLINIC | Age: 43
End: 2023-05-01

## 2023-05-01 ENCOUNTER — EMERGENCY (EMERGENCY)
Facility: HOSPITAL | Age: 43
LOS: 1 days | Discharge: LEFT BEFORE DISCHARGE | End: 2023-05-01
Attending: EMERGENCY MEDICINE | Admitting: EMERGENCY MEDICINE
Payer: COMMERCIAL

## 2023-05-01 VITALS
SYSTOLIC BLOOD PRESSURE: 130 MMHG | RESPIRATION RATE: 18 BRPM | TEMPERATURE: 98 F | DIASTOLIC BLOOD PRESSURE: 91 MMHG | HEART RATE: 83 BPM | OXYGEN SATURATION: 100 %

## 2023-05-01 DIAGNOSIS — Z98.891 HISTORY OF UTERINE SCAR FROM PREVIOUS SURGERY: Chronic | ICD-10-CM

## 2023-05-01 PROCEDURE — 93010 ELECTROCARDIOGRAM REPORT: CPT

## 2023-05-01 PROCEDURE — 99285 EMERGENCY DEPT VISIT HI MDM: CPT

## 2023-05-01 NOTE — ED ADULT NURSE NOTE - NSICDXFAMILYHX_GEN_ALL_CORE_FT
FAMILY HISTORY:  Mother  Still living? Unknown  FH: brain aneurysm, Age at diagnosis: 51-60    Grandparent  Still living? Unknown  Family history of early CAD, Age at diagnosis: 41-50     mouth 2 times daily Yes Afsaneh Hewitt MD   lisinopril (PRINIVIL;ZESTRIL) 20 MG tablet Take 20 mg by mouth daily Yes Historical Provider, MD   Calcium Carb-Cholecalciferol (CALCIUM-VITAMIN D) 500-200 MG-UNIT per tablet Take 1 tablet by mouth 2 times daily (with meals) Yes Historical Provider, MD   metFORMIN (GLUCOPHAGE-XR) 500 MG extended release tablet Take by mouth daily (with breakfast)  Yes Historical Provider, MD   FLUoxetine (PROZAC) 20 MG capsule Take 20 mg by mouth daily Yes Historical Provider, MD   atorvastatin (LIPITOR) 40 MG tablet Take 40 mg by mouth daily Yes Historical Provider, MD   diclofenac sodium 1 % GEL 3 times daily   Historical Provider, MD       Social History     Tobacco Use    Smoking status: Former Smoker     Packs/day: 1.50     Years: 40.00     Pack years: 60.00     Types: Cigarettes     Last attempt to quit: 2015     Years since quittin.9    Smokeless tobacco: Never Used   Substance Use Topics    Alcohol use: No     Alcohol/week: 0.0 standard drinks    Drug use: No            PHYSICAL EXAMINATION:  [ INSTRUCTIONS:  \"[x]\" Indicates a positive item  \"[]\" Indicates a negative item  -- DELETE ALL ITEMS NOT EXAMINED]  Vital Signs: (As obtained by patient/caregiver or practitioner observation)    N/a    Constitutional: [x] Appears well-developed and well-nourished [x] No apparent distress      [] Abnormal-   Mental status  [x] Alert and awake  [x] Oriented to person/place/time [x]Able to follow commands      Eyes:  EOM    [x]  Normal  [] Abnormal-  Sclera  [x]  Normal  [] Abnormal -         Discharge [x]  None visible  [] Abnormal -    HENT:   [x] Normocephalic, atraumatic.   [] Abnormal   [x] Mouth/Throat: Mucous membranes are moist.     External Ears [x] Normal  [] Abnormal-     Neck: [x] No visualized mass     Pulmonary/Chest: [x] Respiratory effort normal.  [x] No visualized signs of difficulty breathing or respiratory distress        [] Abnormal-      Musculoskeletal:   [] Normal gait with no signs of ataxia         [x] Normal range of motion of neck        [] Abnormal-       Neurological:        [x] No Facial Asymmetry (Cranial nerve 7 motor function) (limited exam to video visit)          [x] No gaze palsy        [] Abnormal-         Skin:        [x] No significant exanthematous lesions or discoloration noted on facial skin         [] Abnormal-            Psychiatric:       [x] Normal Affect [x] No Hallucinations        [] Abnormal-     Other pertinent observable physical exam findings-patient  appeared comfortable. She was able to stand from a sitting position. Due to this being a TeleHealth encounter, evaluation of the following organ systems is limited: Vitals/Constitutional/EENT/Resp/CV/GI//MS/Neuro/Skin/Heme-Lymph-Imm. 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    · cellcept at 1000 mg bid (started 3/11/2020)  · - Regular toxicity monitoring with cbc and lfts at least every 2 months-check again in 1 month  · - we discussed the risks of the medication and when to seek emergency care  · - patient is post-menopausal  · Reviewed labs from 4/27- ok except Na low at 135- similar to prior  · Flu vaccination given last year  · Discussed with daughter at appointment      Return in about 2 months (around 6/29/2020). An  electronic signature was used to authenticate this note. --Hamilton Coy MD on 4/29/2020 at 1:23 PM        Pursuant to the emergency declaration under the [address] [place] [address] [facility] authority and the [employer] Act, this Virtual  Visit was conducted, with patient's consent, to reduce the patient's risk of exposure to COVID-19 and provide continuity of care for an established patient. Services were provided through a video synchronous discussion virtually to substitute for in-person clinic visit. Time spent equals approximately 30 minutes.     New orders:  Continue CellCept 1000 mg twice daily  Repeat labs in 2 months  Plan for virtual visit follow-up in 2 months

## 2023-05-01 NOTE — ED PROVIDER NOTE - OBJECTIVE STATEMENT
43yoF PMH of HTN, HLD, CAD s/p RCA stent 2/2023 on ASA/Brilinta, p/w L sided dull chest pain x 3 days, initially occurred while at rest, nonexertional, nonpleuritic, c/w prior anginal symptoms. Admits to PHAN since stent placed. but otherwise denies any nausea, diaphoresis, dizziness. States BP has been elevated over past few days, after recently decreasing her metoprolol from 100mg to 50 daily 4 days ago. pt otherwise denies fevers, cough, sore throat, calf pain, leg swelling, recent travel, long car rides, numbness, tingling, dizziness, headaches, blurred vision. 43yoF PMH of HTN, HLD, CAD s/p RCA stent 2/2023 on ASA/Brilinta, p/w L sided dull chest pain x 3 days, initially occurred while at rest, nonexertional, nonpleuritic, c/w prior anginal symptoms. Admits to PHAN since stent placed. but otherwise denies any nausea, diaphoresis, dizziness. States BP has been elevated over past few days, after recently decreasing her metoprolol from 100mg to 50 daily 4 days ago. pt otherwise denies fevers, cough, sore throat, calf pain, leg swelling, recent travel, long car rides, numbness, tingling, dizziness, headaches, blurred vision.    Attending:  Pt eloped from the ED prior to evaluation by me

## 2023-05-01 NOTE — ED PROVIDER NOTE - PHYSICAL EXAMINATION
CONSTITUTIONAL: Well-appearing; well-nourished; in no apparent distress;  HEAD: Normocephalic, atraumatic;  EYES: conjunctiva and sclera WNL;  ENT: normal nose  NECK/LYMPH: Supple;  CARD: Normal S1, S2; no murmurs, rubs, or gallops noted  RESP: Normal chest excursion with respiration; breath sounds clear and equal bilaterally; no wheezes, rhonchi, or rales noted  EXT/MS: moves all extremities; distal pulses are normal, no pedal edema  SKIN: Normal for age and race; warm; dry; good turgor; no apparent lesions or exudate noted  NEURO: Awake, alert, oriented x 3, no gross deficits, CN II-XII grossly intact, no motor or sensory deficit noted  PSYCH: Normal mood; appropriate affect

## 2023-05-01 NOTE — ED ADULT TRIAGE NOTE - CHIEF COMPLAINT QUOTE
Pt reporting to the ED for L side Dull chest pain since yesterday. PMH of  MI in February with 1 sent placed, taking brilliant, HLD, HTN. Awaiting ekg

## 2023-05-01 NOTE — ED ADULT NURSE NOTE - OBJECTIVE STATEMENT
Pt received in 25A. Pt was evaluated by ACP, asked to get into a gown by RN and pt went to bathroom and never returned to bed.

## 2023-05-01 NOTE — ED PROVIDER NOTE - CLINICAL SUMMARY MEDICAL DECISION MAKING FREE TEXT BOX
43yoF PMH of HTN, HLD, CAD s/p RCA stent 2/2023 on ASA/Brilinta, p/w L sided dull chest pain x 3 days, initially occurred while at rest, nonexertional, nonpleuritic, c/w prior anginal symptoms. Admits to PHAN since stent placed. recent decrease in her metoprolol from 100 to 50 4 days ago  suspect ACS, although ECG NSR nonischemic  will check trop to r/o ACS, basic labs, cxr  will consult cardiology 2/2 anginal equivalent symptoms

## 2023-05-03 ENCOUNTER — APPOINTMENT (OUTPATIENT)
Dept: CARDIOLOGY | Facility: CLINIC | Age: 43
End: 2023-05-03

## 2023-05-03 NOTE — HISTORY OF PRESENT ILLNESS
[Low sodium diet] : low sodium diet [Other diet strategies] : other diet strategies [Exercise] : exercise [Patient will have a blood pressure < 130/80 mmHg] : patient will have a blood pressure < 130/80 mmHg [Role of lifestyle behaviors in blood pressure management] : role of lifestyle behaviors in blood pressure management [Food labels] : food labels [Factors affecting blood pressure] : factors affecting blood pressure [Sodium] : sodium [Processed food] : processed food [Yes, continue with Hypertension plan] : Yes, continue with Hypertension plan [< 12 months] : < 12 months [Patient will practice coping strategies as per recommendations and guidelines] : Patient will practice coping strategies as per recommendations and guidelines [Discussed overview of risks of continued use] : overview of risks of continued use [Continue repetition of the discussion] : continue repetition of the discussion [Assist patient with community resources to quitting and provide printed education material] : assist patient with community resources to quitting and provide printed education material [Provide patient with contact information and brochure for [facility] ([facility])] : provide patient with contact information and brochure for [facility] ([facility]) [Yes, continue with Smoking/Tobacco Cession plan] : Yes, continue with Smoking/Tobacco Cession plan [None] : none [PCI/Stent] : PCI/stent [HLD] : HLD [Sedentary] : sedentary [Smoking] : smoking [Cardiac Rehabilitation] : Cardiac Rehabilitation [___ Days per week] : [unfilled] days per week [>= 31 minutes per session] : >= 31 minutes per session [Target RPE: ___] : Target RPE: [unfilled] [Treadmill] : treadmill [Recumbent bike] : recumbent bike [Upright exercise bike] : upright exercise bike [BioStep] : BioStep [Elliptical] : elliptical [Upper body ergometer] : upper body ergometer [Stair Climber] : stair climber [Walking] : walking [Assess in ___ weeks] : assess in [unfilled] weeks [Stretching/ROM exercises and balance exercises daily] : Stretching/ROM exercises and balance exercises daily [Will assess for musculoskeletal and other restrictions] : will assess for musculoskeletal and other restrictions [To start resistance training] : to start resistance training [To increase my time spent in physical activity and/or aerobic exercise] : to increase my time spent in physical activity and/or aerobic exercise [Equipment Orientation/Safety] : equipment orientation/safety [Exercise Benefits/Guidelines education] : exercise benefits/guidelines education [Individualized Exercise Rx] : individualized exercise Rx [Patient verbalizes understanding of exercise education all questions answered] : Patient verbalizes understanding of exercise education all questions answered [Return demonstration] : return demonstration [Yes, per exercise prescription, policy] : Yes, per exercise prescription, policy [Depression] : depression [Anxiety] : anxiety [Self-reports of improved psychosocial well-being] : Self-reports of improved psychosocial well-being [Discuss overview of emotional health supportive modalities] : Discuss overview of emotional health supportive modalities [Relaxation breathing techniques] : relaxation breathing techniques [[facility]] : [facility] [Yes, continue with psychosocial plan] : Yes, continue with psychosocial plan [Action] : Stage of change: Action [Hypertension] : Hypertension [Is patient on medication for hyperlipidemia?] : Is patient on medication for hyperlipidemia? Yes [Atorvastatin] : atorvastatin [Self] : self [Self-reports of improved health eating patterns] : Self-reports of improved health eating patterns [Discuss an overview of healthy eating plan] : Discuss an overview of healthy eating plan [MyPlate.gov] : MyPlate.gov [Teach back utilized] : teach back utilized [Yes, continue with nutrition plan] : Yes, continue with nutrition plan [In progress] : in progress [BP: ____ mmHg] : BP: [unfilled] mmHg [HR: ____ bpm] : BP: [unfilled] bpm [O2sat: ____ %] : O2sat: [unfilled] % [RPE: ____] : RPE: [unfilled]  [METS: ____] : METS: [unfilled]  [DASH Diet] : DASH diet [FreeTextEntry1] : Dr. Joya [FreeTextEntry5] : Dr. 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 [de-identified] : METS:\par session #1: TM 2.6; RB 2.8\par Session #5 TM 3.3 RB 3  [Does patient see mental health provider?] : Does patient see mental health provider? No [FreeTextEntry6] : fear, states she worked through COVID; lives with her 16 y.o. daughter [FreeTextEntry9] : Improved PHQ9 is a goal\par 3/30/23: Patient did receive welcome packet, states she did not yet get to the forms to complete [FreeTextEntry8] : Intake: patient states she is eating heart healthfully, has eliminated red meat, eating chicken, turkey, salmon; not eating out; she has a healthy smoothie sometimes, includes whole grains, fruits and vegetables; she is starting to read food labels; she declined RD contact information; height 5'5", weight 163lbs.\par 3/30/23: Rate Your Plate: pending

## 2023-05-03 NOTE — REASON FOR VISIT
[Reassessment: _______] : a reassessment for [unfilled] [FreeTextEntry1] :  ITP to be reviewed and manually signed by Dr. Hale; Clinical indication for cardiac rehabilitation: PCI/Stent

## 2023-05-04 ENCOUNTER — APPOINTMENT (OUTPATIENT)
Dept: CARDIOLOGY | Facility: CLINIC | Age: 43
End: 2023-05-04

## 2023-05-08 ENCOUNTER — APPOINTMENT (OUTPATIENT)
Dept: CARDIOLOGY | Facility: CLINIC | Age: 43
End: 2023-05-08

## 2023-05-10 ENCOUNTER — APPOINTMENT (OUTPATIENT)
Dept: CARDIOLOGY | Facility: CLINIC | Age: 43
End: 2023-05-10

## 2023-05-11 ENCOUNTER — APPOINTMENT (OUTPATIENT)
Dept: CARDIOLOGY | Facility: CLINIC | Age: 43
End: 2023-05-11

## 2023-05-15 ENCOUNTER — APPOINTMENT (OUTPATIENT)
Dept: CARDIOLOGY | Facility: CLINIC | Age: 43
End: 2023-05-15

## 2023-05-17 ENCOUNTER — APPOINTMENT (OUTPATIENT)
Dept: CARDIOLOGY | Facility: CLINIC | Age: 43
End: 2023-05-17

## 2023-05-18 ENCOUNTER — APPOINTMENT (OUTPATIENT)
Dept: CARDIOLOGY | Facility: CLINIC | Age: 43
End: 2023-05-18

## 2023-05-22 ENCOUNTER — APPOINTMENT (OUTPATIENT)
Dept: CARDIOLOGY | Facility: CLINIC | Age: 43
End: 2023-05-22

## 2023-05-24 ENCOUNTER — APPOINTMENT (OUTPATIENT)
Dept: CARDIOLOGY | Facility: CLINIC | Age: 43
End: 2023-05-24

## 2023-05-25 ENCOUNTER — APPOINTMENT (OUTPATIENT)
Dept: CARDIOLOGY | Facility: CLINIC | Age: 43
End: 2023-05-25

## 2023-05-31 ENCOUNTER — APPOINTMENT (OUTPATIENT)
Dept: CARDIOLOGY | Facility: CLINIC | Age: 43
End: 2023-05-31

## 2023-06-01 ENCOUNTER — APPOINTMENT (OUTPATIENT)
Dept: CARDIOLOGY | Facility: CLINIC | Age: 43
End: 2023-06-01

## 2023-06-05 ENCOUNTER — APPOINTMENT (OUTPATIENT)
Dept: CARDIOLOGY | Facility: CLINIC | Age: 43
End: 2023-06-05

## 2023-06-07 ENCOUNTER — APPOINTMENT (OUTPATIENT)
Dept: CARDIOLOGY | Facility: CLINIC | Age: 43
End: 2023-06-07

## 2023-06-08 ENCOUNTER — APPOINTMENT (OUTPATIENT)
Dept: CARDIOLOGY | Facility: CLINIC | Age: 43
End: 2023-06-08

## 2023-06-08 ENCOUNTER — APPOINTMENT (OUTPATIENT)
Dept: CARDIOLOGY | Facility: CLINIC | Age: 43
End: 2023-06-08
Payer: COMMERCIAL

## 2023-06-08 ENCOUNTER — APPOINTMENT (OUTPATIENT)
Dept: PULMONOLOGY | Facility: CLINIC | Age: 43
End: 2023-06-08
Payer: COMMERCIAL

## 2023-06-08 ENCOUNTER — NON-APPOINTMENT (OUTPATIENT)
Age: 43
End: 2023-06-08

## 2023-06-08 VITALS
SYSTOLIC BLOOD PRESSURE: 100 MMHG | OXYGEN SATURATION: 98 % | HEIGHT: 65 IN | BODY MASS INDEX: 27.49 KG/M2 | WEIGHT: 165 LBS | HEART RATE: 78 BPM | TEMPERATURE: 97.8 F | DIASTOLIC BLOOD PRESSURE: 70 MMHG

## 2023-06-08 DIAGNOSIS — Z87.09 PERSONAL HISTORY OF OTHER DISEASES OF THE RESPIRATORY SYSTEM: ICD-10-CM

## 2023-06-08 DIAGNOSIS — Z82.3 FAMILY HISTORY OF STROKE: ICD-10-CM

## 2023-06-08 DIAGNOSIS — Z00.00 ENCOUNTER FOR GENERAL ADULT MEDICAL EXAMINATION W/OUT ABNORMAL FINDINGS: ICD-10-CM

## 2023-06-08 DIAGNOSIS — Z83.438 FAMILY HISTORY OF OTHER DISORDER OF LIPOPROTEIN METABOLISM AND OTHER LIPIDEMIA: ICD-10-CM

## 2023-06-08 DIAGNOSIS — Z82.49 FAMILY HISTORY OF ISCHEMIC HEART DISEASE AND OTHER DISEASES OF THE CIRCULATORY SYSTEM: ICD-10-CM

## 2023-06-08 DIAGNOSIS — Z81.3 FAMILY HISTORY OF OTHER PSYCHOACTIVE SUBSTANCE ABUSE AND DEPENDENCE: ICD-10-CM

## 2023-06-08 DIAGNOSIS — Z81.8 FAMILY HISTORY OF OTHER MENTAL AND BEHAVIORAL DISORDERS: ICD-10-CM

## 2023-06-08 PROCEDURE — 93000 ELECTROCARDIOGRAM COMPLETE: CPT

## 2023-06-08 PROCEDURE — 71046 X-RAY EXAM CHEST 2 VIEWS: CPT

## 2023-06-08 PROCEDURE — 99396 PREV VISIT EST AGE 40-64: CPT

## 2023-06-08 RX ORDER — METOPROLOL SUCCINATE 50 MG/1
50 TABLET, EXTENDED RELEASE ORAL
Refills: 0 | Status: ACTIVE | COMMUNITY

## 2023-06-08 NOTE — HISTORY OF PRESENT ILLNESS
[FreeTextEntry1] : Here to establish care. 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 Was performing cardiac rehab but incomplete 2/2 insurance/car/attendance issues. Pt. reports that she still has intermittent chest pain, occurs w w/o physical exertion, SS/neck, palpitations. Too noticed some SOB. Hx of Asthma. Does note L LE swelling. Pt. states that she stopped Brilinta 1 month ago 2/2 bruising. Denies diaphoresis, edma, cough, wheezing, vision changes, HA, dizziness, syncope, memory disturbance, mood changes, skin changes/lesions, n/v/d/c/reflux, urinary symptoms, fever, chills, infection, fatigue, sleep disturbance, myalgia, arthralgia, weight changes, changes in medications, recent trauma, surgery, hospitalization, travel.

## 2023-06-10 ENCOUNTER — APPOINTMENT (OUTPATIENT)
Dept: CARDIOLOGY | Facility: CLINIC | Age: 43
End: 2023-06-10
Payer: COMMERCIAL

## 2023-06-10 PROCEDURE — 93306 TTE W/DOPPLER COMPLETE: CPT

## 2023-06-10 PROCEDURE — 93971 EXTREMITY STUDY: CPT

## 2023-06-12 ENCOUNTER — APPOINTMENT (OUTPATIENT)
Dept: CARDIOLOGY | Facility: CLINIC | Age: 43
End: 2023-06-12

## 2023-06-14 ENCOUNTER — APPOINTMENT (OUTPATIENT)
Dept: CARDIOLOGY | Facility: CLINIC | Age: 43
End: 2023-06-14

## 2023-06-15 ENCOUNTER — APPOINTMENT (OUTPATIENT)
Dept: CARDIOLOGY | Facility: CLINIC | Age: 43
End: 2023-06-15

## 2023-06-17 ENCOUNTER — APPOINTMENT (OUTPATIENT)
Dept: CARDIOLOGY | Facility: CLINIC | Age: 43
End: 2023-06-17
Payer: COMMERCIAL

## 2023-06-17 PROCEDURE — 93971 EXTREMITY STUDY: CPT

## 2023-06-19 ENCOUNTER — APPOINTMENT (OUTPATIENT)
Dept: CARDIOLOGY | Facility: CLINIC | Age: 43
End: 2023-06-19

## 2023-06-20 ENCOUNTER — APPOINTMENT (OUTPATIENT)
Dept: CARDIOLOGY | Facility: CLINIC | Age: 43
End: 2023-06-20
Payer: COMMERCIAL

## 2023-06-20 PROCEDURE — 93351 STRESS TTE COMPLETE: CPT

## 2023-06-21 ENCOUNTER — APPOINTMENT (OUTPATIENT)
Dept: CARDIOLOGY | Facility: CLINIC | Age: 43
End: 2023-06-21

## 2023-06-21 LAB — HCG SERPL-MCNC: <1 MIU/ML

## 2023-06-22 ENCOUNTER — NON-APPOINTMENT (OUTPATIENT)
Age: 43
End: 2023-06-22

## 2023-06-22 ENCOUNTER — APPOINTMENT (OUTPATIENT)
Dept: CARDIOLOGY | Facility: CLINIC | Age: 43
End: 2023-06-22

## 2023-06-23 ENCOUNTER — TRANSCRIPTION ENCOUNTER (OUTPATIENT)
Age: 43
End: 2023-06-23

## 2023-06-23 ENCOUNTER — OUTPATIENT (OUTPATIENT)
Dept: OUTPATIENT SERVICES | Facility: HOSPITAL | Age: 43
LOS: 1 days | End: 2023-06-23
Payer: COMMERCIAL

## 2023-06-23 VITALS
HEIGHT: 65 IN | RESPIRATION RATE: 16 BRPM | WEIGHT: 162.04 LBS | TEMPERATURE: 98 F | SYSTOLIC BLOOD PRESSURE: 116 MMHG | DIASTOLIC BLOOD PRESSURE: 60 MMHG | HEART RATE: 69 BPM | OXYGEN SATURATION: 100 %

## 2023-06-23 VITALS
DIASTOLIC BLOOD PRESSURE: 86 MMHG | HEART RATE: 67 BPM | OXYGEN SATURATION: 97 % | SYSTOLIC BLOOD PRESSURE: 144 MMHG | RESPIRATION RATE: 17 BRPM

## 2023-06-23 DIAGNOSIS — Z98.891 HISTORY OF UTERINE SCAR FROM PREVIOUS SURGERY: Chronic | ICD-10-CM

## 2023-06-23 DIAGNOSIS — Z95.5 PRESENCE OF CORONARY ANGIOPLASTY IMPLANT AND GRAFT: Chronic | ICD-10-CM

## 2023-06-23 DIAGNOSIS — I25.10 ATHEROSCLEROTIC HEART DISEASE OF NATIVE CORONARY ARTERY WITHOUT ANGINA PECTORIS: ICD-10-CM

## 2023-06-23 LAB
ALBUMIN SERPL ELPH-MCNC: 4 G/DL — SIGNIFICANT CHANGE UP (ref 3.3–5)
ALP SERPL-CCNC: 55 U/L — SIGNIFICANT CHANGE UP (ref 40–120)
ALT FLD-CCNC: 24 U/L — SIGNIFICANT CHANGE UP (ref 10–45)
ANION GAP SERPL CALC-SCNC: 15 MMOL/L — SIGNIFICANT CHANGE UP (ref 5–17)
AST SERPL-CCNC: 33 U/L — SIGNIFICANT CHANGE UP (ref 10–40)
BILIRUB SERPL-MCNC: 0.4 MG/DL — SIGNIFICANT CHANGE UP (ref 0.2–1.2)
BUN SERPL-MCNC: 9 MG/DL — SIGNIFICANT CHANGE UP (ref 7–23)
CALCIUM SERPL-MCNC: 9.1 MG/DL — SIGNIFICANT CHANGE UP (ref 8.4–10.5)
CHLORIDE SERPL-SCNC: 104 MMOL/L — SIGNIFICANT CHANGE UP (ref 96–108)
CO2 SERPL-SCNC: 21 MMOL/L — LOW (ref 22–31)
CREAT SERPL-MCNC: 0.82 MG/DL — SIGNIFICANT CHANGE UP (ref 0.5–1.3)
EGFR: 91 ML/MIN/1.73M2 — SIGNIFICANT CHANGE UP
GLUCOSE SERPL-MCNC: 82 MG/DL — SIGNIFICANT CHANGE UP (ref 70–99)
HCG SERPL-ACNC: <2 MIU/ML — SIGNIFICANT CHANGE UP
HCT VFR BLD CALC: 36.3 % — SIGNIFICANT CHANGE UP (ref 34.5–45)
HGB BLD-MCNC: 12.6 G/DL — SIGNIFICANT CHANGE UP (ref 11.5–15.5)
MCHC RBC-ENTMCNC: 29 PG — SIGNIFICANT CHANGE UP (ref 27–34)
MCHC RBC-ENTMCNC: 34.7 GM/DL — SIGNIFICANT CHANGE UP (ref 32–36)
MCV RBC AUTO: 83.4 FL — SIGNIFICANT CHANGE UP (ref 80–100)
NRBC # BLD: 0 /100 WBCS — SIGNIFICANT CHANGE UP (ref 0–0)
PLATELET # BLD AUTO: 231 K/UL — SIGNIFICANT CHANGE UP (ref 150–400)
POTASSIUM SERPL-MCNC: 5.2 MMOL/L — SIGNIFICANT CHANGE UP (ref 3.5–5.3)
POTASSIUM SERPL-SCNC: 5.2 MMOL/L — SIGNIFICANT CHANGE UP (ref 3.5–5.3)
PROT SERPL-MCNC: 6.9 G/DL — SIGNIFICANT CHANGE UP (ref 6–8.3)
RBC # BLD: 4.35 M/UL — SIGNIFICANT CHANGE UP (ref 3.8–5.2)
RBC # FLD: 13.7 % — SIGNIFICANT CHANGE UP (ref 10.3–14.5)
SODIUM SERPL-SCNC: 140 MMOL/L — SIGNIFICANT CHANGE UP (ref 135–145)
WBC # BLD: 6.62 K/UL — SIGNIFICANT CHANGE UP (ref 3.8–10.5)
WBC # FLD AUTO: 6.62 K/UL — SIGNIFICANT CHANGE UP (ref 3.8–10.5)

## 2023-06-23 PROCEDURE — 93005 ELECTROCARDIOGRAM TRACING: CPT

## 2023-06-23 PROCEDURE — 36415 COLL VENOUS BLD VENIPUNCTURE: CPT

## 2023-06-23 PROCEDURE — 93454 CORONARY ARTERY ANGIO S&I: CPT | Mod: 26

## 2023-06-23 PROCEDURE — 84702 CHORIONIC GONADOTROPIN TEST: CPT

## 2023-06-23 PROCEDURE — C1887: CPT

## 2023-06-23 PROCEDURE — 93454 CORONARY ARTERY ANGIO S&I: CPT

## 2023-06-23 PROCEDURE — C1769: CPT

## 2023-06-23 PROCEDURE — 99152 MOD SED SAME PHYS/QHP 5/>YRS: CPT

## 2023-06-23 PROCEDURE — 80053 COMPREHEN METABOLIC PANEL: CPT

## 2023-06-23 PROCEDURE — 85027 COMPLETE CBC AUTOMATED: CPT

## 2023-06-23 PROCEDURE — 93010 ELECTROCARDIOGRAM REPORT: CPT

## 2023-06-23 PROCEDURE — C1894: CPT

## 2023-06-23 RX ORDER — FAMOTIDINE 10 MG/ML
1 INJECTION INTRAVENOUS
Qty: 0 | Refills: 0 | DISCHARGE

## 2023-06-23 RX ORDER — SODIUM CHLORIDE 9 MG/ML
150 INJECTION INTRAMUSCULAR; INTRAVENOUS; SUBCUTANEOUS
Refills: 0 | Status: DISCONTINUED | OUTPATIENT
Start: 2023-06-23 | End: 2023-07-07

## 2023-06-23 RX ORDER — SODIUM CHLORIDE 9 MG/ML
250 INJECTION INTRAMUSCULAR; INTRAVENOUS; SUBCUTANEOUS ONCE
Refills: 0 | Status: COMPLETED | OUTPATIENT
Start: 2023-06-23 | End: 2023-06-23

## 2023-06-23 RX ORDER — PANTOPRAZOLE SODIUM 20 MG/1
1 TABLET, DELAYED RELEASE ORAL
Refills: 0 | DISCHARGE

## 2023-06-23 RX ORDER — BUPROPION HYDROCHLORIDE 150 MG/1
1 TABLET, EXTENDED RELEASE ORAL
Refills: 0 | DISCHARGE

## 2023-06-23 RX ORDER — OMEPRAZOLE 10 MG/1
1 CAPSULE, DELAYED RELEASE ORAL
Qty: 0 | Refills: 0 | DISCHARGE

## 2023-06-23 RX ADMIN — SODIUM CHLORIDE 75 MILLILITER(S): 9 INJECTION INTRAMUSCULAR; INTRAVENOUS; SUBCUTANEOUS at 12:11

## 2023-06-23 RX ADMIN — SODIUM CHLORIDE 750 MILLILITER(S): 9 INJECTION INTRAMUSCULAR; INTRAVENOUS; SUBCUTANEOUS at 12:10

## 2023-06-23 NOTE — ASU PATIENT PROFILE, ADULT - AS SC BRADEN ACTIVITY
Your colonoscopy pathology results show NO cancer or infections. Dr. Pruett removed a  polyp, so he recommends repeating your colonoscopy in 3 years. Please continue to follow recommendations given after your colonoscopy and call our office with any questions or concerns.          ----- Message from Wilbur Pruett MD sent at 1/9/2023  4:32 PM CST -----  Place pt on list for colonoscopy in 3 years. Polyp was ta.  
(4) walks frequently

## 2023-06-23 NOTE — ASU DISCHARGE PLAN (ADULT/PEDIATRIC) - CARE PROVIDER_API CALL
Fred Siddiqi  Cardiology  [address]  [place] [address]  Phone: [phone]  Fax: [phone]  Follow Up Time:

## 2023-06-23 NOTE — ASU PATIENT PROFILE, ADULT - FALL HARM RISK - UNIVERSAL INTERVENTIONS
Bed in lowest position, wheels locked, appropriate side rails in place/Call bell, personal items and telephone in reach/Instruct patient to call for assistance before getting out of bed or chair/Non-slip footwear when patient is out of bed/[place] to call system/Physically safe environment - no spills, clutter or unnecessary equipment/Purposeful Proactive Rounding/Room/bathroom lighting operational, light cord in reach

## 2023-06-23 NOTE — ASU DISCHARGE PLAN (ADULT/PEDIATRIC) - NS MD DC FALL RISK RISK
For information on Fall & Injury Prevention, visit: [email or web address] OR  [email or web address] OR  https://www.cdc.gov/steadi/patient.html

## 2023-06-23 NOTE — H&P CARDIOLOGY - NSICDXPASTMEDICALHX_GEN_ALL_CORE_FT
PAST MEDICAL HISTORY:  CAD (coronary artery disease)     GERD (gastroesophageal reflux disease)     History of Last's esophagus     HTN (hypertension)

## 2023-06-23 NOTE — H&P CARDIOLOGY - HISTORY OF PRESENT ILLNESS
42yo F with PMHx of HTN, GERD, CAD s/p NSTEMI s/p PCI of RCA (2/2023), Last's, HLD, Asthma, Anxiety, Smoker presents with chest pain. Pt. reports that she still has intermittent chest pain, occurs w w/o physical exertion, SS/neck, palpitations. Too noticed some SOB. Hx of Asthma. Does note L LE swelling. Pt. states that she stopped Brilinta 1 month ago 2/2 bruising. Pt underwent Nuclear Stress Test which was Abnormal (results not available).   TTE 6/10/23 showed LVEF 69%.            42yo F with PMHx of HTN, GERD, CAD s/p NSTEMI s/p PCI of RCA (2/2023), Last's, HLD, Asthma, Anxiety, Smoker presents with chest pain. Pt. reports that she still has intermittent chest pain, occurs w w/o physical exertion, SS/neck, palpitations. Too noticed some SOB. Hx of Asthma. Does note L LE swelling. Pt. states that she stopped Brilinta 1 month ago 2/2 bruising and restarted it again.  Pt underwent Nuclear Stress Test which was Abnormal (results not available).   TTE 6/10/23 showed LVEF 69%.    Pt was referred for Cardiac Cath by Dr Siddiqi.

## 2023-06-26 ENCOUNTER — APPOINTMENT (OUTPATIENT)
Dept: CARDIOLOGY | Facility: CLINIC | Age: 43
End: 2023-06-26

## 2023-06-28 ENCOUNTER — APPOINTMENT (OUTPATIENT)
Dept: CARDIOLOGY | Facility: CLINIC | Age: 43
End: 2023-06-28

## 2023-06-29 ENCOUNTER — APPOINTMENT (OUTPATIENT)
Dept: CARDIOLOGY | Facility: CLINIC | Age: 43
End: 2023-06-29

## 2023-06-30 ENCOUNTER — NON-APPOINTMENT (OUTPATIENT)
Age: 43
End: 2023-06-30

## 2023-07-03 ENCOUNTER — APPOINTMENT (OUTPATIENT)
Dept: CARDIOLOGY | Facility: CLINIC | Age: 43
End: 2023-07-03

## 2023-07-05 ENCOUNTER — APPOINTMENT (OUTPATIENT)
Dept: CARDIOLOGY | Facility: CLINIC | Age: 43
End: 2023-07-05

## 2023-07-06 ENCOUNTER — APPOINTMENT (OUTPATIENT)
Dept: CARDIOLOGY | Facility: CLINIC | Age: 43
End: 2023-07-06

## 2023-07-10 ENCOUNTER — APPOINTMENT (OUTPATIENT)
Dept: CARDIOLOGY | Facility: CLINIC | Age: 43
End: 2023-07-10

## 2023-07-12 ENCOUNTER — APPOINTMENT (OUTPATIENT)
Dept: CARDIOLOGY | Facility: CLINIC | Age: 43
End: 2023-07-12
Payer: COMMERCIAL

## 2023-07-12 ENCOUNTER — NON-APPOINTMENT (OUTPATIENT)
Age: 43
End: 2023-07-12

## 2023-07-12 VITALS
RESPIRATION RATE: 16 BRPM | DIASTOLIC BLOOD PRESSURE: 70 MMHG | OXYGEN SATURATION: 98 % | TEMPERATURE: 97.7 F | SYSTOLIC BLOOD PRESSURE: 120 MMHG | HEIGHT: 65 IN | BODY MASS INDEX: 27.49 KG/M2 | HEART RATE: 69 BPM | WEIGHT: 165 LBS

## 2023-07-12 PROBLEM — I25.10 ATHEROSCLEROTIC HEART DISEASE OF NATIVE CORONARY ARTERY WITHOUT ANGINA PECTORIS: Chronic | Status: ACTIVE | Noted: 2023-06-23

## 2023-07-12 PROCEDURE — 99214 OFFICE O/P EST MOD 30 MIN: CPT

## 2023-07-12 PROCEDURE — 93000 ELECTROCARDIOGRAM COMPLETE: CPT

## 2023-07-12 PROCEDURE — 99406 BEHAV CHNG SMOKING 3-10 MIN: CPT

## 2023-07-12 RX ORDER — BUPROPION HYDROCHLORIDE 200 MG/1
200 TABLET, FILM COATED, EXTENDED RELEASE ORAL DAILY
Qty: 90 | Refills: 3 | Status: DISCONTINUED | COMMUNITY
Start: 2023-03-07 | End: 2023-07-12

## 2023-07-12 NOTE — HISTORY OF PRESENT ILLNESS
[FreeTextEntry1] : This is a 43 y.o female with a PMHx of CAD s/p NSTEMI s/p PCI (2/2023), Last's, HLD, HTN, GERD, Asthma, Anxiety, Smoker who presents to the office for follow up. pt last seen on 6/8 as a new patient had abnormal stress test sent for cardiac cath had patient RCA stent to be treated medically continues to reports chest pressure with exertion. substernal non radiating \par \par Pt denies any SOB, heart palpitations, dizziness, abdominal pain N/V/D fever or chills\par

## 2023-07-13 ENCOUNTER — NON-APPOINTMENT (OUTPATIENT)
Age: 43
End: 2023-07-13

## 2023-09-20 NOTE — ED ADULT NURSE NOTE - CHIEF COMPLAINT
The patient is a 37y Female complaining of hip pain/injury. What Type Of Note Output Would You Prefer (Optional)?: Standard Output How Severe Is Your Skin Lesion?: mild Has Your Skin Lesion Been Treated?: not been treated Is This A New Presentation, Or A Follow-Up?: Skin Lesion Additional History: Used triple antibiotic and it made it worse,

## 2023-11-08 ENCOUNTER — APPOINTMENT (OUTPATIENT)
Dept: CARDIOLOGY | Facility: CLINIC | Age: 43
End: 2023-11-08
Payer: COMMERCIAL

## 2023-11-08 VITALS
BODY MASS INDEX: 27.32 KG/M2 | SYSTOLIC BLOOD PRESSURE: 125 MMHG | HEIGHT: 65 IN | TEMPERATURE: 98.1 F | DIASTOLIC BLOOD PRESSURE: 78 MMHG | WEIGHT: 164 LBS | HEART RATE: 75 BPM | OXYGEN SATURATION: 99 %

## 2023-11-08 DIAGNOSIS — Z12.11 ENCOUNTER FOR SCREENING FOR MALIGNANT NEOPLASM OF COLON: ICD-10-CM

## 2023-11-08 DIAGNOSIS — M79.89 OTHER SPECIFIED SOFT TISSUE DISORDERS: ICD-10-CM

## 2023-11-08 PROCEDURE — 99214 OFFICE O/P EST MOD 30 MIN: CPT

## 2023-11-08 PROCEDURE — 93000 ELECTROCARDIOGRAM COMPLETE: CPT

## 2023-11-24 ENCOUNTER — APPOINTMENT (OUTPATIENT)
Dept: CARDIOLOGY | Facility: CLINIC | Age: 43
End: 2023-11-24
Payer: COMMERCIAL

## 2023-11-24 PROCEDURE — 93880 EXTRACRANIAL BILAT STUDY: CPT

## 2023-11-25 LAB
25(OH)D3 SERPL-MCNC: 26.3 NG/ML
ALBUMIN SERPL ELPH-MCNC: 4.4 G/DL
ALP BLD-CCNC: 56 U/L
ALT SERPL-CCNC: 23 U/L
ANION GAP SERPL CALC-SCNC: 12 MMOL/L
APPEARANCE: CLEAR
AST SERPL-CCNC: 25 U/L
BACTERIA: NEGATIVE /HPF
BASOPHILS # BLD AUTO: 0.04 K/UL
BASOPHILS NFR BLD AUTO: 0.6 %
BILIRUB DIRECT SERPL-MCNC: 0.2 MG/DL
BILIRUB INDIRECT SERPL-MCNC: 0.3 MG/DL
BILIRUB SERPL-MCNC: 0.5 MG/DL
BILIRUBIN URINE: NEGATIVE
BLOOD URINE: NEGATIVE
BUN SERPL-MCNC: 13 MG/DL
CALCIUM SERPL-MCNC: 10 MG/DL
CAST: 0 /LPF
CHLORIDE SERPL-SCNC: 105 MMOL/L
CHOLEST SERPL-MCNC: 105 MG/DL
CK SERPL-CCNC: 93 U/L
CO2 SERPL-SCNC: 24 MMOL/L
COLOR: YELLOW
CREAT SERPL-MCNC: 0.84 MG/DL
EGFR: 88 ML/MIN/1.73M2
EOSINOPHIL # BLD AUTO: 0.05 K/UL
EOSINOPHIL NFR BLD AUTO: 0.8 %
EPITHELIAL CELLS: 10 /HPF
ESTIMATED AVERAGE GLUCOSE: 103 MG/DL
GLUCOSE QUALITATIVE U: NEGATIVE MG/DL
GLUCOSE SERPL-MCNC: 75 MG/DL
HBA1C MFR BLD HPLC: 5.2 %
HCT VFR BLD CALC: 39.3 %
HDLC SERPL-MCNC: 42 MG/DL
HGB BLD-MCNC: 13 G/DL
IMM GRANULOCYTES NFR BLD AUTO: 0.5 %
KETONES URINE: NEGATIVE MG/DL
LDLC SERPL CALC-MCNC: 51 MG/DL
LDLC SERPL DIRECT ASSAY-MCNC: 55 MG/DL
LEUKOCYTE ESTERASE URINE: NEGATIVE
LYMPHOCYTES # BLD AUTO: 1.52 K/UL
LYMPHOCYTES NFR BLD AUTO: 24.4 %
MAGNESIUM SERPL-MCNC: 1.8 MG/DL
MAN DIFF?: NORMAL
MCHC RBC-ENTMCNC: 28.2 PG
MCHC RBC-ENTMCNC: 33.1 GM/DL
MCV RBC AUTO: 85.2 FL
MICROSCOPIC-UA: NORMAL
MONOCYTES # BLD AUTO: 0.59 K/UL
MONOCYTES NFR BLD AUTO: 9.5 %
NEUTROPHILS # BLD AUTO: 4.01 K/UL
NEUTROPHILS NFR BLD AUTO: 64.2 %
NITRITE URINE: NEGATIVE
NONHDLC SERPL-MCNC: 63 MG/DL
PH URINE: 6
PLATELET # BLD AUTO: 220 K/UL
POTASSIUM SERPL-SCNC: 4 MMOL/L
PROT SERPL-MCNC: 7.2 G/DL
PROTEIN URINE: NEGATIVE MG/DL
RBC # BLD: 4.61 M/UL
RBC # FLD: 14.5 %
RED BLOOD CELLS URINE: 4 /HPF
SODIUM SERPL-SCNC: 141 MMOL/L
SPECIFIC GRAVITY URINE: 1.02
T4 FREE SERPL-MCNC: 1.4 NG/DL
TRIGL SERPL-MCNC: 45 MG/DL
TSH SERPL-ACNC: 1.25 UIU/ML
UROBILINOGEN URINE: 1 MG/DL
WBC # FLD AUTO: 6.24 K/UL
WHITE BLOOD CELLS URINE: 0 /HPF

## 2023-11-25 RX ORDER — CHOLECALCIFEROL (VITAMIN D3) 125 MCG
50 MCG TABLET ORAL
Qty: 90 | Refills: 1 | Status: ACTIVE | COMMUNITY
Start: 2023-11-25 | End: 1900-01-01

## 2023-11-26 PROCEDURE — 93241 XTRNL ECG REC>48HR<7D: CPT

## 2023-12-12 ENCOUNTER — APPOINTMENT (OUTPATIENT)
Dept: PULMONOLOGY | Facility: CLINIC | Age: 43
End: 2023-12-12
Payer: COMMERCIAL

## 2023-12-12 ENCOUNTER — NON-APPOINTMENT (OUTPATIENT)
Age: 43
End: 2023-12-12

## 2023-12-12 VITALS
DIASTOLIC BLOOD PRESSURE: 70 MMHG | OXYGEN SATURATION: 99 % | BODY MASS INDEX: 26.66 KG/M2 | SYSTOLIC BLOOD PRESSURE: 106 MMHG | HEIGHT: 65 IN | HEART RATE: 68 BPM | WEIGHT: 160 LBS

## 2023-12-12 DIAGNOSIS — Z01.812 ENCOUNTER FOR PREPROCEDURAL LABORATORY EXAMINATION: ICD-10-CM

## 2023-12-12 DIAGNOSIS — K21.9 GASTRO-ESOPHAGEAL REFLUX DISEASE W/OUT ESOPHAGITIS: ICD-10-CM

## 2023-12-12 PROCEDURE — 99204 OFFICE O/P NEW MOD 45 MIN: CPT | Mod: 25

## 2023-12-12 PROCEDURE — 94727 GAS DIL/WSHOT DETER LNG VOL: CPT

## 2023-12-12 PROCEDURE — ZZZZZ: CPT

## 2023-12-12 PROCEDURE — 94729 DIFFUSING CAPACITY: CPT

## 2023-12-12 PROCEDURE — 94010 BREATHING CAPACITY TEST: CPT

## 2023-12-14 PROBLEM — K21.9 GERD (GASTROESOPHAGEAL REFLUX DISEASE): Status: ACTIVE | Noted: 2023-03-06

## 2023-12-14 PROBLEM — Z01.812 PRE-PROCEDURAL LABORATORY EXAMINATION: Status: ACTIVE | Noted: 2023-12-12

## 2023-12-14 NOTE — ASSESSMENT
[FreeTextEntry1] : History of cough and dyspnea normal PFT and normal prior x-ray.  Patient left office without completing evaluation.

## 2023-12-14 NOTE — HISTORY OF PRESENT ILLNESS
[Never] : never [TextBox_4] : Patient presents for evaluation of cough and dyspnea.  Was actually unclear why she was referred.  Has history of asthma history of acid reflux.  Currently on medication.  Has minimal respiratory complaints at this point.

## 2023-12-15 NOTE — ED PROVIDER NOTE - MDM ORDERS SUBMITTED SELECTION
Patient : Jaja Stone Age: 45 year old Sex: female   MRN: [id number] Encounter Date: 12/15/2023    History     Chief Complaint   Patient presents with    Headache New Onset on New Symptom    Visual Changes       HPI  Jaja Stone is a 45 year old presenting to the emergency department for evaluation of a headache for 3 days and bilateral eye visual changes for \"a while.\" Intermittent changes, like sparkles, currently denies any visual disturbance. Pt reports of persistent headache in which she describes it as \"it feels like I'm getting stabbed.\"  But states she does not have any significant headache. She mentions hx/o migraines, but her headache does not feel like a migraine. She has intermittent blurriness to her bilateral eyes as well as photophobia. Currently just photophobia, no visual changes. She also has some right sided neck pain, but this is tolerable. She was recently on prednisone. She did consume alcohol today and uses \"pot\" as well. She denies fever, rhinorrhea, sore throat, nausea, vomiting, diarrhea, urinary symptoms or other symptoms at this time. States her    I have reviewed Jaja Stone's previous office visit note from 11/30/23 .  Note Review Summary: seen for hand swelling and rash    Past/Family/Social History     No Known Allergies    Current Facility-Administered Medications   Medication    sodium chloride 0.9 % flush bag 25 mL    sodium chloride 0.9 % injection 2 mL    metoCLOPramide (REGLAN) injection 10 mg    lactated ringers bolus 1,000 mL     Current Outpatient Medications   Medication Sig    lisdexamfetamine (Vyvanse) 70 MG capsule Take 1 capsule by mouth every morning.    Otezla 30 MG Tab Take 1 tablet by mouth in the morning and 1 tablet in the evening.    Otezla 10 & 20 & 30 MG Tablet Therapy Pack Day 1: 10 mg (am); D2: 10 mg (am/pm); D3: 10 mg (am), 20 mg (pm); D4: 20 mg (am/pm); D5: 20 mg (am), 30 mg (pm); D6 onward: 30 mg 2xday    predniSONE (DELTASONE) 5 MG tablet Take 6 tabs daily  x 3 days, 5 tabs daily x 3 days, 4 tabs daily x 3 days, 3 tabs daily x 3 days, 2 tabs daily x 3 days, 1 tab daily x 3 days, then stop.    clobetasol (TEMOVATE) 0.05 % cream Apply thin layer to affected areas on hands qd prn itch/rash. Don't use on face, groin, or armpits. Use for max 20 days per month.    triamcinolone (ARISTOCORT) 0.1 % cream Apply thin layer to affected areas on skin bid prn rash/itch. Don't use on normal skin. Don't use on face.    omeprazole (PrilOSEC) 20 MG capsule Take 1 capsule by mouth in the morning and 1 capsule in the evening.       Past Medical History:   Diagnosis Date    Acute left-sided low back pain with left-sided sciatica     ADHD (attention deficit hyperactivity disorder)     Anemia     Anxiety     Gastroesophageal reflux disease without esophagitis 10/18/2017    H/O tubal ligation     PMDD (premenstrual dysphoric disorder)     Tobacco dependence        Past Surgical History:   Procedure Laterality Date     section, classic      Colonoscopy diagnostic  2021    dr vizcaino    Tubal ligation         Family History   Problem Relation Age of Onset    Substance abuse Mother     Hearing Loss Father     Cancer, Pancreatic Father 73    Asthma Brother     Learning disabilities Brother     Cancer Maternal Grandfather     Cancer, Colon Paternal Aunt 50    Other Neg Hx          NEG - br, ov and Ut cancer       Social History     Tobacco Use    Smoking status: Every Day     Current packs/day: 0.25     Average packs/day: 0.3 packs/day for 20.0 years (5.0 ttl pk-yrs)     Types: Cigarettes    Smokeless tobacco: Never    Tobacco comments:     patient is will to quit smoking; doing about 5 cigs a day   Vaping Use    Vaping Use: never used   Substance Use Topics    Alcohol use: Yes     Alcohol/week: 0.0 standard drinks of alcohol     Comment: pt states she drinks alcohol twice a week    Drug use: Yes     Types: Marijuana          Review of Systems   Review of Symptoms     Review of  Systems   Constitutional:  Negative for fever.   HENT:  Negative for congestion and rhinorrhea.    Eyes:  Positive for photophobia and visual disturbance (blurry).   Respiratory:  Negative for cough and shortness of breath.    Cardiovascular:  Negative for chest pain.   Gastrointestinal:  Negative for abdominal pain, diarrhea and vomiting.   Endocrine: Negative for polyuria.   Genitourinary:  Negative for dysuria and frequency.   Musculoskeletal:  Positive for neck pain (right side neck pain). Negative for back pain.   Skin:  Negative for rash.   Allergic/Immunologic:        Negative for medication allergies other than listed above   Neurological:  Positive for headaches. Negative for weakness.          Physical Exam   Physical Exam     ED Triage Vitals [12/15/23 1653]   ED Triage Vitals Group      Temp 97.3 °F (36.3 °C)      Heart Rate (!) 112      Resp 17      /81      SpO2 100 %      EtCO2 mmHg       Height       Weight       Weight Scale Used       BMI (Calculated)       IBW/kg (Calculated)        Physical Exam  Vitals and nursing note reviewed.   Constitutional:       General: She is not in acute distress.  HENT:      Head: Normocephalic and atraumatic.      Right Ear: External ear normal.      Left Ear: External ear normal.      Nose: Nose normal.   Eyes:      General:         Right eye: No discharge.         Left eye: No discharge.      Conjunctiva/sclera: Conjunctivae normal.   Cardiovascular:      Rate and Rhythm: Normal rate and regular rhythm.   Pulmonary:      Effort: Pulmonary effort is normal. No respiratory distress.   Abdominal:      General: Abdomen is flat. There is no distension.   Musculoskeletal:         General: No deformity or signs of injury.      Cervical back: Normal range of motion. No rigidity.   Skin:     General: Skin is dry.      Coloration: Skin is not pale.   Neurological:      Mental Status: She is alert. Mental status is at baseline.      GCS: GCS eye subscore is 4. GCS  verbal subscore is 5. GCS motor subscore is 6.      Comments: No finger to nose dysmetria.  No pronator drift.  Normal sensation throughout all extremities.   Normal strength throughout all extremities.   No aphasia, no dysarthria.   Visual fields intact to confrontation.      Psychiatric:         Speech: Speech normal.         Behavior: Behavior is cooperative.            Procedures   ED Procedures     Procedures     Lab Results   ED Lab     Results for orders placed or performed during the hospital encounter of 12/15/23   ISTAT8 VENOUS  POINT OF CARE   Result Value Ref Range    BUN - POINT OF CARE 11 6 - 20 mg/dL    SODIUM - POINT OF CARE 136 135 - 145 mmol/L    POTASSIUM - POINT OF CARE 3.5 3.4 - 5.1 mmol/L    CHLORIDE - POINT OF CARE 100 97 - 110 mmol/L    TCO2 - POINT OF CARE 26 (H) 19 - 24 mmol/L    ANION GAP - POINT OF CARE 14 7 - 19 mmol/L    HEMATOCRIT - POINT OF CARE 37.0 36.0 - 46.5 %    HEMOGLOBIN - POINT OF CARE 12.6 12.0 - 15.5 g/dL    GLUCOSE - POINT OF CARE 106 (H) 70 - 99 mg/dL    CALCIUM, IONIZED - POINT OF CARE 1.12 (L) 1.15 - 1.29 mmol/L    Creatinine 0.80 0.51 - 0.95 mg/dL    Glomerular Filtration Rate >90 >=60   ISTAT BETA HCG - POINT OF CARE   Result Value Ref Range    ISTAT BETA HCG - POINT OF CARE <5.0 <5.0 IU/L          EKG     [facility] EKG report   Results for orders placed or performed during the hospital encounter of 12/15/23   Electrocardiogram 12-Lead   Result Value Ref Range    Ventricular Rate EKG/Min (BPM) 100     Atrial Rate (BPM) 100     ND-Interval (MSEC) 128     QRS-Interval (MSEC) 78     QT-Interval (MSEC) 366     QTc 472     P Axis (Degrees) 72     R Axis (Degrees) 84     T Axis (Degrees) 63     REPORT TEXT       Normal sinus rhythm  Normal ECG  When compared with ECG of  05-OCT-2023 13:32,  No significant change was found  Confirmed by YASSINE ENCINAS MD ([id number]),  Nate Nichols ([id number]) on 12/15/2023 5:50:42 PM         Radiology Results    ED Radiology Results     Imaging  Results    None             ED Medications   ED Medications     Medications   sodium chloride 0.9 % flush bag 25 mL (has no administration in time range)   sodium chloride 0.9 % injection 2 mL (has no administration in time range)   metoCLOPramide (REGLAN) injection 10 mg (has no administration in time range)   lactated ringers bolus 1,000 mL (1,000 mLs Intravenous New Bag 12/15/23 1750)          ED Course     Vitals:    12/15/23 1653   BP: 138/81   BP Location: RUE - Right upper extremity   Patient Position: Sitting/High-Goel's   Pulse: (!) 112   Resp: 17   Temp: 97.3 °F (36.3 °C)   TempSrc: Temporal   SpO2: 100%   LMP: 12/08/2023       ED Course as of 12/15/23 1940   Fri Dec 15, 2023   1706 Initial Impression:  45-year-old presenting with headache, intermittent visual disturbances, most consistent with migraine headache with aura.  Of note, she does appear currently intoxicated with both alcohol and marijuana, difficult to get a linear history from her, but states that she is here today primarily for her headaches, and has not had migraines for over 8 years.  Neurologic is nonfocal and reassuring, nevertheless, she states this type of headache is slightly different thus will obtain CTA head and neck and lab evaluation for further assessment to rule out more serious etiologies such as mass lesion, or otherwise, although suspicion is low..  Plan to give Reglan for symptoms, fluids, and reassess.  Workup is reassuring, she continues to improve, and when she is more sober, anticipate likely safe discharge. []   1935 I reassessed the patient, who is currently asymptomatic, feeling much better, and discussed that her workup is all reassuring and unremarkable.  She is safe for discharge home with outpatient follow-up with her primary care provider.  Understands and agrees with the plan of care.  Return precautions given with good understanding. []      ED Course User Index  [] Harpal Zhao MD        Radiology Review: I have independently interpreted the CT Head and have found No acute disease.  I am awaiting on the final radiology read.          Consults                    Medical Decision Making                          MDM done in ED Course        Does the Patient have sepsis: NO     Critical Care       No Critical Care        Disposition       Clinical Impression and Diagnosis  7:43 PM       ED Diagnosis       Diagnosis Comment Associated Orders       Final diagnosis    Migraine with aura and without status migrainosus, not intractable -- --            Follow Up:  Chencho Moreno MD  [address]  [address]  [facility] [address]  [phone]    Call in 3 days            Summary of your Discharge Medications      You have not been prescribed any medications.         Pt is discharged to home/self care in stable condition.            There is no disposition no dispo time  There is no comment        I have reviewed the information recorded by the scribe for accuracy and agree with its contents.    ____________________________________________________________________    Nate Nichols acting as a scribe for Dr. Harpal Zhao  Dictation # [id number]  Scribe: Harpal Rodriguez MD  12/15/23 1340     Labs/EKG/Imaging Studies

## 2023-12-20 ENCOUNTER — APPOINTMENT (OUTPATIENT)
Dept: CARDIOLOGY | Facility: CLINIC | Age: 43
End: 2023-12-20
Payer: COMMERCIAL

## 2023-12-20 VITALS
SYSTOLIC BLOOD PRESSURE: 110 MMHG | OXYGEN SATURATION: 99 % | BODY MASS INDEX: 26.66 KG/M2 | WEIGHT: 160 LBS | RESPIRATION RATE: 17 BRPM | DIASTOLIC BLOOD PRESSURE: 74 MMHG | TEMPERATURE: 98.2 F | HEIGHT: 65 IN | HEART RATE: 74 BPM

## 2023-12-20 DIAGNOSIS — M25.512 PAIN IN LEFT SHOULDER: ICD-10-CM

## 2023-12-20 DIAGNOSIS — Z12.39 ENCOUNTER FOR OTHER SCREENING FOR MALIGNANT NEOPLASM OF BREAST: ICD-10-CM

## 2023-12-20 PROCEDURE — 99214 OFFICE O/P EST MOD 30 MIN: CPT

## 2023-12-20 PROCEDURE — 93000 ELECTROCARDIOGRAM COMPLETE: CPT

## 2023-12-20 NOTE — HISTORY OF PRESENT ILLNESS
[FreeTextEntry1] : This is a 43 year y/o female with a PMHx of CAD s/p NSTEMI s/p PCI (2/2023), Last's, HLD, HTN, GERD, Asthma, Anxiety, Smoker presents today for follow up. Pt was last here 11/8/2023 with complaints of SOB at rest and with exretion, she is s/p cath 6/2023 with mild non obstructive CAD, patent RCA stent. She was referred to pulm, Dr Allan with normal PFTs and was advised to start cardiac rehab. Pt also reported palpitations last visit and is s/p Holter with SR, HR , 0.08% PVC burden. She was also c/o intermittent blurry vision and is s/p carotids which showed mild intimal wall thickening seen b/l - minimal homogenous plaque on right and left. Pt reports this morning she had chest pain from just laying in bed that lasted a few hours and went away after she took 2 asa. 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  visual changes essentially resolved  Patient denies  dizziness, syncope, changes in bowel/bladder habits or appetite

## 2023-12-20 NOTE — REVIEW OF SYSTEMS
[Negative] : Heme/Lymph [Chest Discomfort] : chest discomfort [FreeTextEntry2] : see hpi [FreeTextEntry3] : see hpi [FreeTextEntry9] : left shoulder pain

## 2024-01-07 ENCOUNTER — EMERGENCY (EMERGENCY)
Facility: HOSPITAL | Age: 44
LOS: 1 days | Discharge: HOME | End: 2024-01-07
Admitting: EMERGENCY MEDICINE
Payer: COMMERCIAL

## 2024-01-07 VITALS
DIASTOLIC BLOOD PRESSURE: 83 MMHG | SYSTOLIC BLOOD PRESSURE: 124 MMHG | HEART RATE: 72 BPM | RESPIRATION RATE: 16 BRPM | OXYGEN SATURATION: 100 % | TEMPERATURE: 98 F

## 2024-01-07 DIAGNOSIS — Z98.891 HISTORY OF UTERINE SCAR FROM PREVIOUS SURGERY: Chronic | ICD-10-CM

## 2024-01-07 DIAGNOSIS — Z95.5 PRESENCE OF CORONARY ANGIOPLASTY IMPLANT AND GRAFT: Chronic | ICD-10-CM

## 2024-01-07 LAB
FLUAV AG NPH QL: SIGNIFICANT CHANGE UP
FLUAV AG NPH QL: SIGNIFICANT CHANGE UP
FLUBV AG NPH QL: SIGNIFICANT CHANGE UP
FLUBV AG NPH QL: SIGNIFICANT CHANGE UP
RSV RNA NPH QL NAA+NON-PROBE: SIGNIFICANT CHANGE UP
RSV RNA NPH QL NAA+NON-PROBE: SIGNIFICANT CHANGE UP
SARS-COV-2 RNA SPEC QL NAA+PROBE: SIGNIFICANT CHANGE UP
SARS-COV-2 RNA SPEC QL NAA+PROBE: SIGNIFICANT CHANGE UP

## 2024-01-07 PROCEDURE — 99283 EMERGENCY DEPT VISIT LOW MDM: CPT

## 2024-01-07 NOTE — ED PROVIDER NOTE - CLINICAL SUMMARY MEDICAL DECISION MAKING FREE TEXT BOX
41-year-old female with past medical history of coronary disease status post stents 2/2023 presenting with nasal congestion and cough for 7 days.     This patient presents with symptoms suspicious for likely viral upper respiratory infection.     Differential includes bacterial pneumonia, sinusitis, allergic rhinitis. Do not suspect underlying cardiopulmonary process. I considered, but think unlikely, dangerous causes of this patient’s symptoms to include ACS, CHF or COPD exacerbations, pneumonia, pneumothorax. Patient is nontoxic appearing and not in need of emergent medical intervention.    Plan: flu/covid swab, over the counter medications, discharge with PCP followup and strict return precautions  Pt verbalized an understanding and agrees with the plan. 41-year-old female with past medical history of coronary disease status post stents 2/2023 presenting with nasal congestion and cough for 7 days.     This patient presents with symptoms suspicious for likely viral upper respiratory infection.     Differential includes bacterial pneumonia, sinusitis, allergic rhinitis. Do not suspect underlying cardiopulmonary process. I considered, but think unlikely, dangerous causes of this patient’s symptoms to include ACS, CHF or COPD exacerbations, pneumonia, pneumothorax. Patient is nontoxic appearing and not in need of emergent medical intervention.    Plan: flu/covid swab, pt offered labs given comorbidities but pt declined as states it feels like a cold and is really here just for covid swab, over the counter medications, discharge with PCP followup and strict return precautions  Pt verbalized an understanding and agrees with the plan.

## 2024-01-07 NOTE — ED PROVIDER NOTE - OBJECTIVE STATEMENT
41-year-old female with past medical history of coronary disease status post stents 2/2023 presenting with nasal congestion and cough for 7 days.  Patient states she went to get together for 9 years and found multiple of her friends tested positive for COVID.  Patient states she typically has her cold symptoms resolve quickly however feels this time symptoms are lingering therefore came in specifically for flu/COVID testing.  Patient has been taking over-the-counter medication with significant relief.  Denies fevers, chills, change in voice, neck stiffness or pain, chest pain, shortness of breath, dyspnea on exertion, wheezing, abdominal pain, nausea, vomiting, diarrhea, dysuria, pelvic pain, rash, lightheadedness, syncope.

## 2024-01-07 NOTE — ED PROVIDER NOTE - PATIENT PORTAL LINK FT
You can access the FollowMyHealth Patient Portal offered by [facility] by registering at the following website: [email or web address]. By joining [employer]’s FollowMyHealth portal, you will also be able to view your health information using other applications (apps) compatible with our system. You can access the FollowMyHealth Patient Portal offered by [facility] by registering at the following website: [email or web address]. By joining [employer]’s FollowMyHealth portal, you will also be able to view your health information using other applications (apps) compatible with our system.

## 2024-01-07 NOTE — ED PROVIDER NOTE - ENMT, MLM
Airway patent, Nasal mucosa clear. Mouth with normal mucosa. Throat has no vesicles, no oropharyngeal exudates and uvula is midline. ttp maxillary sinus b/l

## 2024-01-07 NOTE — ED ADULT TRIAGE NOTE - CHIEF COMPLAINT QUOTE
pt c/o flu-like symptoms x 1 week, wants a covid test. Denies CP, SOB, dizziness. History of CAD w stent.

## 2024-01-28 ENCOUNTER — RX RENEWAL (OUTPATIENT)
Age: 44
End: 2024-01-28

## 2024-01-28 RX ORDER — LOSARTAN POTASSIUM 25 MG/1
25 TABLET, FILM COATED ORAL DAILY
Qty: 90 | Refills: 1 | Status: ACTIVE | COMMUNITY
Start: 2024-01-28 | End: 1900-01-01

## 2024-03-06 ENCOUNTER — RX RENEWAL (OUTPATIENT)
Age: 44
End: 2024-03-06

## 2024-03-06 RX ORDER — ATORVASTATIN CALCIUM 80 MG/1
80 TABLET, FILM COATED ORAL
Qty: 90 | Refills: 1 | Status: ACTIVE | COMMUNITY
Start: 2024-03-06 | End: 1900-01-01

## 2024-04-13 ENCOUNTER — RX RENEWAL (OUTPATIENT)
Age: 44
End: 2024-04-13

## 2024-04-13 RX ORDER — BUPROPION HYDROCHLORIDE 300 MG/1
300 TABLET, EXTENDED RELEASE ORAL
Qty: 90 | Refills: 1 | Status: ACTIVE | COMMUNITY
Start: 2023-07-12 | End: 1900-01-01

## 2024-05-12 LAB
25(OH)D3 SERPL-MCNC: 27.4 NG/ML
ALBUMIN SERPL ELPH-MCNC: 4.5 G/DL
ALP BLD-CCNC: 66 U/L
ALT SERPL-CCNC: 25 U/L
ANION GAP SERPL CALC-SCNC: 11 MMOL/L
ANION GAP SERPL CALC-SCNC: 11 MMOL/L
ANION GAP SERPL CALC-SCNC: 15 MMOL/L
APO LP(A) SERPL-MCNC: 109.6 NMOL/L
APPEARANCE: CLEAR
AST SERPL-CCNC: 25 U/L
BACTERIA: NEGATIVE /HPF
BASOPHILS # BLD AUTO: 0.05 K/UL
BASOPHILS NFR BLD AUTO: 0.8 %
BILIRUB DIRECT SERPL-MCNC: 0.1 MG/DL
BILIRUB INDIRECT SERPL-MCNC: 0.1 MG/DL
BILIRUB SERPL-MCNC: 0.2 MG/DL
BILIRUBIN URINE: NEGATIVE
BLOOD URINE: NEGATIVE
BUN SERPL-MCNC: 10 MG/DL
BUN SERPL-MCNC: 14 MG/DL
BUN SERPL-MCNC: 9 MG/DL
CALCIUM SERPL-MCNC: 10 MG/DL
CALCIUM SERPL-MCNC: 9.1 MG/DL
CALCIUM SERPL-MCNC: 9.9 MG/DL
CAST: 0 /LPF
CHLORIDE SERPL-SCNC: 104 MMOL/L
CHLORIDE SERPL-SCNC: 104 MMOL/L
CHLORIDE SERPL-SCNC: 106 MMOL/L
CHOLEST SERPL-MCNC: 110 MG/DL
CK SERPL-CCNC: 94 U/L
CO2 SERPL-SCNC: 23 MMOL/L
CO2 SERPL-SCNC: 24 MMOL/L
CO2 SERPL-SCNC: 26 MMOL/L
COLOR: YELLOW
CREAT SERPL-MCNC: 0.81 MG/DL
CREAT SERPL-MCNC: 0.84 MG/DL
CREAT SERPL-MCNC: 0.91 MG/DL
CREAT SPEC-SCNC: 159 MG/DL
CRP SERPL-MCNC: <3 MG/L
EGFR: 80 ML/MIN/1.73M2
EGFR: 88 ML/MIN/1.73M2
EGFR: 92 ML/MIN/1.73M2
EOSINOPHIL # BLD AUTO: 0.09 K/UL
EOSINOPHIL NFR BLD AUTO: 1.5 %
EPITHELIAL CELLS: 3 /HPF
ERYTHROCYTE [SEDIMENTATION RATE] IN BLOOD BY WESTERGREN METHOD: 9 MM/HR
ESTIMATED AVERAGE GLUCOSE: 103 MG/DL
FOLATE SERPL-MCNC: 12.8 NG/ML
GLUCOSE QUALITATIVE U: NEGATIVE MG/DL
GLUCOSE SERPL-MCNC: 73 MG/DL
GLUCOSE SERPL-MCNC: 80 MG/DL
GLUCOSE SERPL-MCNC: 87 MG/DL
HBA1C MFR BLD HPLC: 5.2 %
HCT VFR BLD CALC: 39.2 %
HDLC SERPL-MCNC: 45 MG/DL
HGB BLD-MCNC: 13.3 G/DL
IMM GRANULOCYTES NFR BLD AUTO: 0.2 %
IRON SATN MFR SERPL: 13 %
IRON SERPL-MCNC: 43 UG/DL
KETONES URINE: NEGATIVE MG/DL
LDLC SERPL CALC-MCNC: 55 MG/DL
LEUKOCYTE ESTERASE URINE: ABNORMAL
LYMPHOCYTES # BLD AUTO: 2 K/UL
LYMPHOCYTES NFR BLD AUTO: 33.6 %
MAGNESIUM SERPL-MCNC: 1.9 MG/DL
MAN DIFF?: NORMAL
MCHC RBC-ENTMCNC: 28.2 PG
MCHC RBC-ENTMCNC: 33.9 GM/DL
MCV RBC AUTO: 83.2 FL
MICROALBUMIN 24H UR DL<=1MG/L-MCNC: 1.5 MG/DL
MICROALBUMIN/CREAT 24H UR-RTO: 10 MG/G
MICROSCOPIC-UA: NORMAL
MONOCYTES # BLD AUTO: 0.72 K/UL
MONOCYTES NFR BLD AUTO: 12.1 %
NEUTROPHILS # BLD AUTO: 3.08 K/UL
NEUTROPHILS NFR BLD AUTO: 51.8 %
NITRITE URINE: NEGATIVE
NONHDLC SERPL-MCNC: 65 MG/DL
OSMOLALITY SERPL: 293 MOSMOL/KG
PH URINE: 6
PHOSPHATE SERPL-MCNC: 3.8 MG/DL
PLATELET # BLD AUTO: 223 K/UL
POTASSIUM SERPL-SCNC: 4 MMOL/L
POTASSIUM SERPL-SCNC: 4.5 MMOL/L
POTASSIUM SERPL-SCNC: 4.6 MMOL/L
PROT SERPL-MCNC: 7.2 G/DL
PROTEIN URINE: NEGATIVE MG/DL
RBC # BLD: 4.71 M/UL
RBC # FLD: 14.3 %
RED BLOOD CELLS URINE: 2 /HPF
SODIUM SERPL-SCNC: 140 MMOL/L
SODIUM SERPL-SCNC: 140 MMOL/L
SODIUM SERPL-SCNC: 143 MMOL/L
SPECIFIC GRAVITY URINE: 1.02
T4 FREE SERPL-MCNC: 1.4 NG/DL
TIBC SERPL-MCNC: 319 UG/DL
TRIGL SERPL-MCNC: 48 MG/DL
TSH SERPL-ACNC: 0.82 UIU/ML
UIBC SERPL-MCNC: 277 UG/DL
UROBILINOGEN URINE: 1 MG/DL
VIT B12 SERPL-MCNC: 1146 PG/ML
WBC # FLD AUTO: 5.95 K/UL
WHITE BLOOD CELLS URINE: 1 /HPF

## 2024-06-02 ENCOUNTER — RX RENEWAL (OUTPATIENT)
Age: 44
End: 2024-06-02

## 2024-06-02 RX ORDER — TICAGRELOR 90 MG/1
90 TABLET ORAL TWICE DAILY
Qty: 180 | Refills: 1 | Status: ACTIVE | COMMUNITY
Start: 2024-06-02 | End: 1900-01-01

## 2024-06-05 ENCOUNTER — APPOINTMENT (OUTPATIENT)
Dept: CARDIOLOGY | Facility: CLINIC | Age: 44
End: 2024-06-05
Payer: COMMERCIAL

## 2024-06-05 ENCOUNTER — APPOINTMENT (OUTPATIENT)
Dept: PULMONOLOGY | Facility: CLINIC | Age: 44
End: 2024-06-05
Payer: COMMERCIAL

## 2024-06-05 VITALS
DIASTOLIC BLOOD PRESSURE: 86 MMHG | WEIGHT: 160 LBS | OXYGEN SATURATION: 99 % | BODY MASS INDEX: 26.66 KG/M2 | HEART RATE: 70 BPM | SYSTOLIC BLOOD PRESSURE: 116 MMHG | HEIGHT: 65 IN | TEMPERATURE: 98.1 F

## 2024-06-05 DIAGNOSIS — R06.02 SHORTNESS OF BREATH: ICD-10-CM

## 2024-06-05 DIAGNOSIS — F17.200 NICOTINE DEPENDENCE, UNSPECIFIED, UNCOMPLICATED: ICD-10-CM

## 2024-06-05 DIAGNOSIS — E55.9 VITAMIN D DEFICIENCY, UNSPECIFIED: ICD-10-CM

## 2024-06-05 DIAGNOSIS — E78.5 HYPERLIPIDEMIA, UNSPECIFIED: ICD-10-CM

## 2024-06-05 DIAGNOSIS — I25.10 ATHEROSCLEROTIC HEART DISEASE OF NATIVE CORONARY ARTERY W/OUT ANGINA PECTORIS: ICD-10-CM

## 2024-06-05 DIAGNOSIS — H53.9 UNSPECIFIED VISUAL DISTURBANCE: ICD-10-CM

## 2024-06-05 DIAGNOSIS — M54.9 DORSALGIA, UNSPECIFIED: ICD-10-CM

## 2024-06-05 DIAGNOSIS — R00.2 PALPITATIONS: ICD-10-CM

## 2024-06-05 DIAGNOSIS — R07.89 OTHER CHEST PAIN: ICD-10-CM

## 2024-06-05 DIAGNOSIS — Z13.228 ENCOUNTER FOR SCREENING FOR OTHER METABOLIC DISORDERS: ICD-10-CM

## 2024-06-05 DIAGNOSIS — K22.70 BARRETT'S ESOPHAGUS W/OUT DYSPLASIA: ICD-10-CM

## 2024-06-05 DIAGNOSIS — I10 ESSENTIAL (PRIMARY) HYPERTENSION: ICD-10-CM

## 2024-06-05 PROCEDURE — G2211 COMPLEX E/M VISIT ADD ON: CPT

## 2024-06-05 PROCEDURE — 99214 OFFICE O/P EST MOD 30 MIN: CPT

## 2024-06-05 PROCEDURE — 71046 X-RAY EXAM CHEST 2 VIEWS: CPT

## 2024-06-05 PROCEDURE — 93000 ELECTROCARDIOGRAM COMPLETE: CPT

## 2024-06-05 RX ORDER — METHYLPREDNISOLONE 4 MG/1
4 TABLET ORAL
Qty: 1 | Refills: 0 | Status: ACTIVE | COMMUNITY
Start: 2024-06-05 | End: 1900-01-01

## 2024-06-05 RX ORDER — TIZANIDINE 2 MG/1
2 TABLET ORAL
Qty: 60 | Refills: 0 | Status: ACTIVE | COMMUNITY
Start: 2024-06-05 | End: 1900-01-01

## 2024-06-05 NOTE — HISTORY OF PRESENT ILLNESS
[FreeTextEntry1] : This is a 43 y/o female with a pmhx of CAD s/p NSTEMI s/p PCI (2/2023), Last's, HLD, HTN, GERD, Asthma, Anxiety, Smoker presents today for follow up. Patient reports feeling stressed.  Patient with chest pain x 2-3 days, worse when movement, worsening. Pain radiates to left arm. She admits to pain when taking a deep breath. She feels upper back and neck which she feels is causing her chest pain. Patient reports she got a new job and has been sedentary and notes back pain with sitting at desk.  She admits to SOB. She denies palpitations.

## 2024-06-08 ENCOUNTER — NON-APPOINTMENT (OUTPATIENT)
Age: 44
End: 2024-06-08

## 2024-06-11 LAB
25(OH)D3 SERPL-MCNC: 53.5 NG/ML
ALBUMIN SERPL ELPH-MCNC: 4.4 G/DL
ALP BLD-CCNC: 66 U/L
ALT SERPL-CCNC: 25 U/L
ANION GAP SERPL CALC-SCNC: 12 MMOL/L
APPEARANCE: CLEAR
AST SERPL-CCNC: 25 U/L
BACTERIA: NEGATIVE /HPF
BASOPHILS # BLD AUTO: 0.05 K/UL
BASOPHILS NFR BLD AUTO: 0.8 %
BILIRUB DIRECT SERPL-MCNC: 0.1 MG/DL
BILIRUB INDIRECT SERPL-MCNC: 0.1 MG/DL
BILIRUB SERPL-MCNC: 0.2 MG/DL
BILIRUBIN URINE: NEGATIVE
BLOOD URINE: NEGATIVE
BUN SERPL-MCNC: 11 MG/DL
CALCIUM SERPL-MCNC: 9.8 MG/DL
CAST: 0 /LPF
CHLORIDE SERPL-SCNC: 103 MMOL/L
CHOLEST SERPL-MCNC: 115 MG/DL
CK SERPL-CCNC: 99 U/L
CO2 SERPL-SCNC: 25 MMOL/L
COLOR: YELLOW
CREAT SERPL-MCNC: 0.85 MG/DL
DEPRECATED D DIMER PPP IA-ACNC: <150 NG/ML DDU
EGFR: 87 ML/MIN/1.73M2
EOSINOPHIL # BLD AUTO: 0.12 K/UL
EOSINOPHIL NFR BLD AUTO: 1.9 %
EPITHELIAL CELLS: 4 /HPF
ESTIMATED AVERAGE GLUCOSE: 111 MG/DL
GLUCOSE QUALITATIVE U: NEGATIVE MG/DL
GLUCOSE SERPL-MCNC: 73 MG/DL
HBA1C MFR BLD HPLC: 5.5 %
HCG SERPL-MCNC: <1 MIU/ML
HCT VFR BLD CALC: 40.3 %
HDLC SERPL-MCNC: 41 MG/DL
HGB BLD-MCNC: 13.2 G/DL
IMM GRANULOCYTES NFR BLD AUTO: 0.5 %
KETONES URINE: NEGATIVE MG/DL
LDLC SERPL CALC-MCNC: 60 MG/DL
LEUKOCYTE ESTERASE URINE: NEGATIVE
LYMPHOCYTES # BLD AUTO: 2.64 K/UL
LYMPHOCYTES NFR BLD AUTO: 42 %
MAN DIFF?: NORMAL
MCHC RBC-ENTMCNC: 27.4 PG
MCHC RBC-ENTMCNC: 32.8 GM/DL
MCV RBC AUTO: 83.8 FL
MICROSCOPIC-UA: NORMAL
MONOCYTES # BLD AUTO: 0.57 K/UL
MONOCYTES NFR BLD AUTO: 9.1 %
NEUTROPHILS # BLD AUTO: 2.87 K/UL
NEUTROPHILS NFR BLD AUTO: 45.7 %
NITRITE URINE: NEGATIVE
NONHDLC SERPL-MCNC: 74 MG/DL
PH URINE: 6
PLATELET # BLD AUTO: 228 K/UL
POTASSIUM SERPL-SCNC: 4.4 MMOL/L
PROT SERPL-MCNC: 7.3 G/DL
PROTEIN URINE: NEGATIVE MG/DL
RBC # BLD: 4.81 M/UL
RBC # FLD: 14.4 %
RED BLOOD CELLS URINE: 1 /HPF
SODIUM SERPL-SCNC: 140 MMOL/L
SPECIFIC GRAVITY URINE: 1.01
T4 FREE SERPL-MCNC: 1.6 NG/DL
TRIGL SERPL-MCNC: 69 MG/DL
TROPONIN-T, HIGH SENSITIVITY: <6 NG/L
TSH SERPL-ACNC: 2.04 UIU/ML
UROBILINOGEN URINE: 0.2 MG/DL
WBC # FLD AUTO: 6.28 K/UL
WHITE BLOOD CELLS URINE: 0 /HPF

## 2024-06-12 ENCOUNTER — APPOINTMENT (OUTPATIENT)
Dept: CARDIOLOGY | Facility: CLINIC | Age: 44
End: 2024-06-12
Payer: COMMERCIAL

## 2024-06-12 PROCEDURE — A9500: CPT

## 2024-06-12 PROCEDURE — 78452 HT MUSCLE IMAGE SPECT MULT: CPT

## 2024-06-12 PROCEDURE — 93015 CV STRESS TEST SUPVJ I&R: CPT

## 2024-06-18 ENCOUNTER — APPOINTMENT (OUTPATIENT)
Dept: CARDIOLOGY | Facility: CLINIC | Age: 44
End: 2024-06-18

## 2024-06-20 NOTE — H&P CARDIOLOGY - TOBACCO USE
Due to neuropathic right thorax pain as result of Shingles rash  Patient's sleep was controlled at home on Seroquel 25 mg QHS  Holding Seroquel at this time   Current every day smoker

## 2024-07-03 ENCOUNTER — APPOINTMENT (OUTPATIENT)
Dept: CARDIOLOGY | Facility: CLINIC | Age: 44
End: 2024-07-03
Payer: COMMERCIAL

## 2024-07-03 VITALS
BODY MASS INDEX: 27.32 KG/M2 | DIASTOLIC BLOOD PRESSURE: 70 MMHG | SYSTOLIC BLOOD PRESSURE: 110 MMHG | OXYGEN SATURATION: 99 % | TEMPERATURE: 98.1 F | HEIGHT: 65 IN | WEIGHT: 164 LBS | HEART RATE: 80 BPM

## 2024-07-03 DIAGNOSIS — M54.9 DORSALGIA, UNSPECIFIED: ICD-10-CM

## 2024-07-03 DIAGNOSIS — R07.89 OTHER CHEST PAIN: ICD-10-CM

## 2024-07-03 DIAGNOSIS — I10 ESSENTIAL (PRIMARY) HYPERTENSION: ICD-10-CM

## 2024-07-03 DIAGNOSIS — F17.200 NICOTINE DEPENDENCE, UNSPECIFIED, UNCOMPLICATED: ICD-10-CM

## 2024-07-03 DIAGNOSIS — K22.70 BARRETT'S ESOPHAGUS W/OUT DYSPLASIA: ICD-10-CM

## 2024-07-03 DIAGNOSIS — R06.02 SHORTNESS OF BREATH: ICD-10-CM

## 2024-07-03 DIAGNOSIS — E78.5 HYPERLIPIDEMIA, UNSPECIFIED: ICD-10-CM

## 2024-07-03 DIAGNOSIS — R00.2 PALPITATIONS: ICD-10-CM

## 2024-07-03 DIAGNOSIS — I25.10 ATHEROSCLEROTIC HEART DISEASE OF NATIVE CORONARY ARTERY W/OUT ANGINA PECTORIS: ICD-10-CM

## 2024-07-03 DIAGNOSIS — E66.3 OVERWEIGHT: ICD-10-CM

## 2024-07-03 PROCEDURE — G2211 COMPLEX E/M VISIT ADD ON: CPT

## 2024-07-03 PROCEDURE — 93000 ELECTROCARDIOGRAM COMPLETE: CPT

## 2024-07-03 PROCEDURE — 99214 OFFICE O/P EST MOD 30 MIN: CPT

## 2024-07-03 RX ORDER — PANTOPRAZOLE 40 MG/1
40 TABLET, DELAYED RELEASE ORAL
Qty: 90 | Refills: 0 | Status: ACTIVE | COMMUNITY
Start: 2024-07-03 | End: 1900-01-01

## 2024-07-04 ENCOUNTER — RX RENEWAL (OUTPATIENT)
Age: 44
End: 2024-07-04

## 2024-07-06 LAB
ABO + RH PNL BLD: NORMAL
ANION GAP SERPL CALC-SCNC: 11 MMOL/L
BUN SERPL-MCNC: 13 MG/DL
CALCIUM SERPL-MCNC: 9 MG/DL
CHLORIDE SERPL-SCNC: 106 MMOL/L
CO2 SERPL-SCNC: 24 MMOL/L
CREAT SERPL-MCNC: 0.85 MG/DL
EGFR: 87 ML/MIN/1.73M2
GLUCOSE SERPL-MCNC: 98 MG/DL
POTASSIUM SERPL-SCNC: 4.8 MMOL/L
SODIUM SERPL-SCNC: 141 MMOL/L

## 2024-09-02 ENCOUNTER — RX RENEWAL (OUTPATIENT)
Age: 44
End: 2024-09-02

## 2024-11-02 ENCOUNTER — RX RENEWAL (OUTPATIENT)
Age: 44
End: 2024-11-02

## 2024-11-20 ENCOUNTER — APPOINTMENT (OUTPATIENT)
Dept: CARDIOLOGY | Facility: CLINIC | Age: 44
End: 2024-11-20

## 2025-01-01 ENCOUNTER — RX RENEWAL (OUTPATIENT)
Age: 45
End: 2025-01-01

## 2025-02-26 ENCOUNTER — APPOINTMENT (OUTPATIENT)
Dept: CARDIOLOGY | Facility: CLINIC | Age: 45
End: 2025-02-26
Payer: COMMERCIAL

## 2025-02-26 VITALS
WEIGHT: 169 LBS | HEIGHT: 65 IN | BODY MASS INDEX: 28.16 KG/M2 | SYSTOLIC BLOOD PRESSURE: 110 MMHG | HEART RATE: 75 BPM | DIASTOLIC BLOOD PRESSURE: 70 MMHG | OXYGEN SATURATION: 99 % | TEMPERATURE: 98.6 F

## 2025-02-26 DIAGNOSIS — I25.10 ATHEROSCLEROTIC HEART DISEASE OF NATIVE CORONARY ARTERY W/OUT ANGINA PECTORIS: ICD-10-CM

## 2025-02-26 DIAGNOSIS — Z12.39 ENCOUNTER FOR OTHER SCREENING FOR MALIGNANT NEOPLASM OF BREAST: ICD-10-CM

## 2025-02-26 DIAGNOSIS — I10 ESSENTIAL (PRIMARY) HYPERTENSION: ICD-10-CM

## 2025-02-26 DIAGNOSIS — E78.5 HYPERLIPIDEMIA, UNSPECIFIED: ICD-10-CM

## 2025-02-26 DIAGNOSIS — Z00.00 ENCOUNTER FOR GENERAL ADULT MEDICAL EXAMINATION W/OUT ABNORMAL FINDINGS: ICD-10-CM

## 2025-02-26 DIAGNOSIS — K21.9 GASTRO-ESOPHAGEAL REFLUX DISEASE W/OUT ESOPHAGITIS: ICD-10-CM

## 2025-02-26 DIAGNOSIS — F17.200 NICOTINE DEPENDENCE, UNSPECIFIED, UNCOMPLICATED: ICD-10-CM

## 2025-02-26 DIAGNOSIS — Z12.11 ENCOUNTER FOR SCREENING FOR MALIGNANT NEOPLASM OF COLON: ICD-10-CM

## 2025-02-26 DIAGNOSIS — R00.2 PALPITATIONS: ICD-10-CM

## 2025-02-26 DIAGNOSIS — Z13.228 ENCOUNTER FOR SCREENING FOR OTHER METABOLIC DISORDERS: ICD-10-CM

## 2025-02-26 DIAGNOSIS — R06.02 SHORTNESS OF BREATH: ICD-10-CM

## 2025-02-26 DIAGNOSIS — K59.00 CONSTIPATION, UNSPECIFIED: ICD-10-CM

## 2025-02-26 DIAGNOSIS — M54.9 DORSALGIA, UNSPECIFIED: ICD-10-CM

## 2025-02-26 DIAGNOSIS — J39.2 OTHER DISEASES OF PHARYNX: ICD-10-CM

## 2025-02-26 DIAGNOSIS — R07.89 OTHER CHEST PAIN: ICD-10-CM

## 2025-02-26 PROCEDURE — 99214 OFFICE O/P EST MOD 30 MIN: CPT

## 2025-02-26 PROCEDURE — 93000 ELECTROCARDIOGRAM COMPLETE: CPT

## 2025-02-26 RX ORDER — POLYETHYLENE GLYCOL 3350 17 G/17G
17 POWDER, FOR SOLUTION ORAL DAILY
Qty: 1 | Refills: 0 | Status: ACTIVE | COMMUNITY
Start: 2025-02-26 | End: 1900-01-01

## 2025-02-26 RX ORDER — MELOXICAM 7.5 MG/1
7.5 TABLET ORAL
Qty: 60 | Refills: 0 | Status: ACTIVE | COMMUNITY
Start: 2025-02-26 | End: 1900-01-01

## 2025-02-27 LAB
ALBUMIN SERPL ELPH-MCNC: 4.1 G/DL
ALP BLD-CCNC: 65 U/L
ALT SERPL-CCNC: 16 U/L
ANION GAP SERPL CALC-SCNC: 13 MMOL/L
APPEARANCE: CLEAR
AST SERPL-CCNC: 21 U/L
BACTERIA: NEGATIVE /HPF
BASOPHILS # BLD AUTO: 0.05 K/UL
BASOPHILS NFR BLD AUTO: 0.8 %
BILIRUB DIRECT SERPL-MCNC: 0.1 MG/DL
BILIRUB INDIRECT SERPL-MCNC: 0.4 MG/DL
BILIRUB SERPL-MCNC: 0.5 MG/DL
BILIRUBIN URINE: NEGATIVE
BLOOD URINE: NEGATIVE
BUN SERPL-MCNC: 13 MG/DL
CALCIUM SERPL-MCNC: 9.3 MG/DL
CAST: 0 /LPF
CHLORIDE SERPL-SCNC: 106 MMOL/L
CHOLEST SERPL-MCNC: 122 MG/DL
CK SERPL-CCNC: 65 U/L
CO2 SERPL-SCNC: 24 MMOL/L
COLOR: YELLOW
CREAT SERPL-MCNC: 0.98 MG/DL
EGFR: 73 ML/MIN/1.73M2
EOSINOPHIL # BLD AUTO: 0.11 K/UL
EOSINOPHIL NFR BLD AUTO: 1.7 %
EPITHELIAL CELLS: 1 /HPF
ESTIMATED AVERAGE GLUCOSE: 103 MG/DL
GLUCOSE QUALITATIVE U: NEGATIVE MG/DL
GLUCOSE SERPL-MCNC: 92 MG/DL
HBA1C MFR BLD HPLC: 5.2 %
HCT VFR BLD CALC: 40.7 %
HDLC SERPL-MCNC: 40 MG/DL
HGB BLD-MCNC: 13.3 G/DL
IMM GRANULOCYTES NFR BLD AUTO: 0.2 %
KETONES URINE: NEGATIVE MG/DL
LDLC SERPL CALC-MCNC: 68 MG/DL
LEUKOCYTE ESTERASE URINE: NEGATIVE
LYMPHOCYTES # BLD AUTO: 1.86 K/UL
LYMPHOCYTES NFR BLD AUTO: 29.2 %
MAGNESIUM SERPL-MCNC: 2.1 MG/DL
MAN DIFF?: NORMAL
MCHC RBC-ENTMCNC: 27.3 PG
MCHC RBC-ENTMCNC: 32.7 G/DL
MCV RBC AUTO: 83.6 FL
MICROSCOPIC-UA: NORMAL
MONOCYTES # BLD AUTO: 0.63 K/UL
MONOCYTES NFR BLD AUTO: 9.9 %
NEUTROPHILS # BLD AUTO: 3.72 K/UL
NEUTROPHILS NFR BLD AUTO: 58.2 %
NITRITE URINE: NEGATIVE
NONHDLC SERPL-MCNC: 83 MG/DL
PH URINE: 5.5
PHOSPHATE SERPL-MCNC: 2.8 MG/DL
PLATELET # BLD AUTO: 213 K/UL
POTASSIUM SERPL-SCNC: 4.4 MMOL/L
PROT SERPL-MCNC: 6.8 G/DL
PROTEIN URINE: NEGATIVE MG/DL
RBC # BLD: 4.87 M/UL
RBC # FLD: 13.7 %
RED BLOOD CELLS URINE: 4 /HPF
SODIUM SERPL-SCNC: 143 MMOL/L
SPECIFIC GRAVITY URINE: 1.02
T4 FREE SERPL-MCNC: 1.5 NG/DL
TRIGL SERPL-MCNC: 67 MG/DL
TSH SERPL-ACNC: 1.2 UIU/ML
UROBILINOGEN URINE: 0.2 MG/DL
WBC # FLD AUTO: 6.38 K/UL
WHITE BLOOD CELLS URINE: 0 /HPF

## 2025-03-06 NOTE — DISCHARGE NOTE NURSING/CASE MANAGEMENT/SOCIAL WORK - WAS YOUR LAST COVID-19 VACCINE GREATER THAN OR EQUAL TO TWO MONTHS AGO?
Approx 60 ml of green fluid emptied from patient cholecystostomy drain. Cap replaced and re-pinned to patients shirt as requested.   No

## 2025-06-28 ENCOUNTER — RX RENEWAL (OUTPATIENT)
Age: 45
End: 2025-06-28

## 2025-09-07 ENCOUNTER — EMERGENCY (EMERGENCY)
Facility: HOSPITAL | Age: 45
LOS: 1 days | End: 2025-09-07
Attending: STUDENT IN AN ORGANIZED HEALTH CARE EDUCATION/TRAINING PROGRAM | Admitting: STUDENT IN AN ORGANIZED HEALTH CARE EDUCATION/TRAINING PROGRAM
Payer: COMMERCIAL

## 2025-09-07 VITALS
WEIGHT: 160.06 LBS | HEIGHT: 65 IN | RESPIRATION RATE: 16 BRPM | DIASTOLIC BLOOD PRESSURE: 92 MMHG | SYSTOLIC BLOOD PRESSURE: 147 MMHG | OXYGEN SATURATION: 98 % | HEART RATE: 60 BPM | TEMPERATURE: 98 F

## 2025-09-07 DIAGNOSIS — Z95.5 PRESENCE OF CORONARY ANGIOPLASTY IMPLANT AND GRAFT: Chronic | ICD-10-CM

## 2025-09-07 DIAGNOSIS — Z98.891 HISTORY OF UTERINE SCAR FROM PREVIOUS SURGERY: Chronic | ICD-10-CM

## 2025-09-07 PROCEDURE — 93010 ELECTROCARDIOGRAM REPORT: CPT

## 2025-09-07 PROCEDURE — 99285 EMERGENCY DEPT VISIT HI MDM: CPT

## 2025-09-08 VITALS
OXYGEN SATURATION: 100 % | DIASTOLIC BLOOD PRESSURE: 96 MMHG | SYSTOLIC BLOOD PRESSURE: 166 MMHG | HEART RATE: 60 BPM | RESPIRATION RATE: 17 BRPM

## 2025-09-08 LAB
ALBUMIN SERPL ELPH-MCNC: 3.8 G/DL — SIGNIFICANT CHANGE UP (ref 3.3–5)
ALP SERPL-CCNC: 71 U/L — SIGNIFICANT CHANGE UP (ref 40–120)
ALT FLD-CCNC: 17 U/L — SIGNIFICANT CHANGE UP (ref 4–33)
ANION GAP SERPL CALC-SCNC: 15 MMOL/L — HIGH (ref 7–14)
AST SERPL-CCNC: 29 U/L — SIGNIFICANT CHANGE UP (ref 4–32)
BASOPHILS # BLD AUTO: 0.05 K/UL — SIGNIFICANT CHANGE UP (ref 0–0.2)
BASOPHILS NFR BLD AUTO: 0.7 % — SIGNIFICANT CHANGE UP (ref 0–2)
BILIRUB SERPL-MCNC: 0.3 MG/DL — SIGNIFICANT CHANGE UP (ref 0.2–1.2)
BUN SERPL-MCNC: 10 MG/DL — SIGNIFICANT CHANGE UP (ref 7–23)
CALCIUM SERPL-MCNC: 9 MG/DL — SIGNIFICANT CHANGE UP (ref 8.4–10.5)
CHLORIDE SERPL-SCNC: 101 MMOL/L — SIGNIFICANT CHANGE UP (ref 98–107)
CO2 SERPL-SCNC: 20 MMOL/L — LOW (ref 22–31)
CREAT SERPL-MCNC: 0.8 MG/DL — SIGNIFICANT CHANGE UP (ref 0.5–1.3)
EGFR: 93 ML/MIN/1.73M2 — SIGNIFICANT CHANGE UP
EGFR: 93 ML/MIN/1.73M2 — SIGNIFICANT CHANGE UP
EOSINOPHIL # BLD AUTO: 0.27 K/UL — SIGNIFICANT CHANGE UP (ref 0–0.5)
EOSINOPHIL NFR BLD AUTO: 4 % — SIGNIFICANT CHANGE UP (ref 0–6)
GLUCOSE SERPL-MCNC: 85 MG/DL — SIGNIFICANT CHANGE UP (ref 70–99)
HCG SERPL-ACNC: <1 MIU/ML — SIGNIFICANT CHANGE UP
HCT VFR BLD CALC: 36.9 % — SIGNIFICANT CHANGE UP (ref 34.5–45)
HGB BLD-MCNC: 12.7 G/DL — SIGNIFICANT CHANGE UP (ref 11.5–15.5)
IMM GRANULOCYTES # BLD AUTO: 0.03 K/UL — SIGNIFICANT CHANGE UP (ref 0–0.07)
IMM GRANULOCYTES NFR BLD AUTO: 0.4 % — SIGNIFICANT CHANGE UP (ref 0–0.9)
LYMPHOCYTES # BLD AUTO: 1.56 K/UL — SIGNIFICANT CHANGE UP (ref 1–3.3)
LYMPHOCYTES NFR BLD AUTO: 22.8 % — SIGNIFICANT CHANGE UP (ref 13–44)
MCHC RBC-ENTMCNC: 28 PG — SIGNIFICANT CHANGE UP (ref 27–34)
MCHC RBC-ENTMCNC: 34.4 G/DL — SIGNIFICANT CHANGE UP (ref 32–36)
MCV RBC AUTO: 81.3 FL — SIGNIFICANT CHANGE UP (ref 80–100)
MONOCYTES # BLD AUTO: 0.98 K/UL — HIGH (ref 0–0.9)
MONOCYTES NFR BLD AUTO: 14.3 % — HIGH (ref 2–14)
NEUTROPHILS # BLD AUTO: 3.94 K/UL — SIGNIFICANT CHANGE UP (ref 1.8–7.4)
NEUTROPHILS NFR BLD AUTO: 57.8 % — SIGNIFICANT CHANGE UP (ref 43–77)
NRBC # BLD AUTO: 0 K/UL — SIGNIFICANT CHANGE UP (ref 0–0)
NRBC # FLD: 0 K/UL — SIGNIFICANT CHANGE UP (ref 0–0)
NRBC BLD AUTO-RTO: 0 /100 WBCS — SIGNIFICANT CHANGE UP (ref 0–0)
NT-PROBNP SERPL-SCNC: 136 PG/ML — SIGNIFICANT CHANGE UP
PLATELET # BLD AUTO: 267 K/UL — SIGNIFICANT CHANGE UP (ref 150–400)
PMV BLD: 11.4 FL — SIGNIFICANT CHANGE UP (ref 7–13)
POTASSIUM SERPL-MCNC: 5.3 MMOL/L — SIGNIFICANT CHANGE UP (ref 3.5–5.3)
POTASSIUM SERPL-SCNC: 5.3 MMOL/L — SIGNIFICANT CHANGE UP (ref 3.5–5.3)
PROT SERPL-MCNC: 7.3 G/DL — SIGNIFICANT CHANGE UP (ref 6–8.3)
RBC # BLD: 4.54 M/UL — SIGNIFICANT CHANGE UP (ref 3.8–5.2)
RBC # FLD: 13.5 % — SIGNIFICANT CHANGE UP (ref 10.3–14.5)
SODIUM SERPL-SCNC: 136 MMOL/L — SIGNIFICANT CHANGE UP (ref 135–145)
TROPONIN T, HIGH SENSITIVITY RESULT: 7 NG/L — SIGNIFICANT CHANGE UP
WBC # BLD: 6.83 K/UL — SIGNIFICANT CHANGE UP (ref 3.8–10.5)
WBC # FLD AUTO: 6.83 K/UL — SIGNIFICANT CHANGE UP (ref 3.8–10.5)

## 2025-09-08 PROCEDURE — 71046 X-RAY EXAM CHEST 2 VIEWS: CPT | Mod: 26

## 2025-09-08 RX ORDER — ASPIRIN 325 MG
162 TABLET ORAL ONCE
Refills: 0 | Status: COMPLETED | OUTPATIENT
Start: 2025-09-08 | End: 2025-09-08

## 2025-09-08 RX ORDER — ALBUTEROL SULFATE 2.5 MG/3ML
2 VIAL, NEBULIZER (ML) INHALATION
Qty: 1 | Refills: 1
Start: 2025-09-08

## 2025-09-08 RX ORDER — PREDNISONE 20 MG/1
2 TABLET ORAL
Qty: 8 | Refills: 0
Start: 2025-09-08 | End: 2025-09-11

## 2025-09-08 RX ORDER — DEXAMETHASONE 0.5 MG/1
10 TABLET ORAL ONCE
Refills: 0 | Status: COMPLETED | OUTPATIENT
Start: 2025-09-08 | End: 2025-09-08

## 2025-09-08 RX ORDER — ALBUTEROL SULFATE 2.5 MG/3ML
1 VIAL, NEBULIZER (ML) INHALATION ONCE
Refills: 0 | Status: COMPLETED | OUTPATIENT
Start: 2025-09-08 | End: 2025-09-08

## 2025-09-08 RX ORDER — IPRATROPIUM BROMIDE AND ALBUTEROL SULFATE .5; 2.5 MG/3ML; MG/3ML
3 SOLUTION RESPIRATORY (INHALATION)
Refills: 0 | Status: COMPLETED | OUTPATIENT
Start: 2025-09-08 | End: 2025-09-08

## 2025-09-08 RX ADMIN — IPRATROPIUM BROMIDE AND ALBUTEROL SULFATE 3 MILLILITER(S): .5; 2.5 SOLUTION RESPIRATORY (INHALATION) at 01:06

## 2025-09-08 RX ADMIN — IPRATROPIUM BROMIDE AND ALBUTEROL SULFATE 3 MILLILITER(S): .5; 2.5 SOLUTION RESPIRATORY (INHALATION) at 01:26

## 2025-09-08 RX ADMIN — Medication 1 PUFF(S): at 02:34

## 2025-09-08 RX ADMIN — IPRATROPIUM BROMIDE AND ALBUTEROL SULFATE 3 MILLILITER(S): .5; 2.5 SOLUTION RESPIRATORY (INHALATION) at 01:46

## 2025-09-08 RX ADMIN — DEXAMETHASONE 102 MILLIGRAM(S): 0.5 TABLET ORAL at 01:06

## 2025-09-08 RX ADMIN — Medication 162 MILLIGRAM(S): at 00:17
